# Patient Record
Sex: MALE | Race: BLACK OR AFRICAN AMERICAN | Employment: UNEMPLOYED | URBAN - METROPOLITAN AREA
[De-identification: names, ages, dates, MRNs, and addresses within clinical notes are randomized per-mention and may not be internally consistent; named-entity substitution may affect disease eponyms.]

---

## 2017-01-11 ENCOUNTER — APPOINTMENT (OUTPATIENT)
Dept: CT IMAGING | Age: 52
DRG: 388 | End: 2017-01-11
Attending: STUDENT IN AN ORGANIZED HEALTH CARE EDUCATION/TRAINING PROGRAM
Payer: SELF-PAY

## 2017-01-11 ENCOUNTER — HOSPITAL ENCOUNTER (INPATIENT)
Age: 52
LOS: 2 days | Discharge: HOME | DRG: 388 | End: 2017-01-13
Attending: EMERGENCY MEDICINE | Admitting: STUDENT IN AN ORGANIZED HEALTH CARE EDUCATION/TRAINING PROGRAM
Payer: SELF-PAY

## 2017-01-11 ENCOUNTER — APPOINTMENT (OUTPATIENT)
Dept: GENERAL RADIOLOGY | Age: 52
DRG: 388 | End: 2017-01-11
Attending: EMERGENCY MEDICINE
Payer: SELF-PAY

## 2017-01-11 DIAGNOSIS — N18.9 ANEMIA IN CHRONIC KIDNEY DISEASE: Primary | ICD-10-CM

## 2017-01-11 DIAGNOSIS — D63.1 ANEMIA IN CHRONIC KIDNEY DISEASE: Primary | ICD-10-CM

## 2017-01-11 DIAGNOSIS — K62.5 RECTAL BLEEDING: ICD-10-CM

## 2017-01-11 PROBLEM — K92.2 GIB (GASTROINTESTINAL BLEEDING): Status: ACTIVE | Noted: 2017-01-11

## 2017-01-11 LAB
ALBUMIN SERPL BCP-MCNC: 2.8 G/DL (ref 3.5–5)
ALBUMIN/GLOB SERPL: 0.7 {RATIO} (ref 1.1–2.2)
ALP SERPL-CCNC: 92 U/L (ref 45–117)
ALT SERPL-CCNC: 21 U/L (ref 12–78)
ANION GAP BLD CALC-SCNC: 13 MMOL/L (ref 5–15)
AST SERPL W P-5'-P-CCNC: 17 U/L (ref 15–37)
BASOPHILS # BLD AUTO: 0 K/UL (ref 0–0.1)
BASOPHILS # BLD: 0 % (ref 0–1)
BILIRUB SERPL-MCNC: 0.7 MG/DL (ref 0.2–1)
BUN SERPL-MCNC: 32 MG/DL (ref 6–20)
BUN/CREAT SERPL: 4 (ref 12–20)
CALCIUM SERPL-MCNC: 8.2 MG/DL (ref 8.5–10.1)
CHLORIDE SERPL-SCNC: 101 MMOL/L (ref 97–108)
CO2 SERPL-SCNC: 28 MMOL/L (ref 21–32)
CREAT SERPL-MCNC: 7.77 MG/DL (ref 0.7–1.3)
DIFFERENTIAL METHOD BLD: ABNORMAL
EOSINOPHIL # BLD: 0.3 K/UL (ref 0–0.4)
EOSINOPHIL NFR BLD: 2 % (ref 0–7)
ERYTHROCYTE [DISTWIDTH] IN BLOOD BY AUTOMATED COUNT: 14.5 % (ref 11.5–14.5)
FERRITIN SERPL-MCNC: 1620 NG/ML (ref 26–388)
GLOBULIN SER CALC-MCNC: 4.1 G/DL (ref 2–4)
GLUCOSE SERPL-MCNC: 100 MG/DL (ref 65–100)
HCT VFR BLD AUTO: 23.1 % (ref 36.6–50.3)
HEMOCCULT STL QL: POSITIVE
HGB BLD-MCNC: 7.3 G/DL (ref 12.1–17)
LIPASE SERPL-CCNC: 157 U/L (ref 73–393)
LYMPHOCYTES # BLD AUTO: 6 % (ref 12–49)
LYMPHOCYTES # BLD: 0.8 K/UL (ref 0.8–3.5)
MCH RBC QN AUTO: 28.5 PG (ref 26–34)
MCHC RBC AUTO-ENTMCNC: 31.6 G/DL (ref 30–36.5)
MCV RBC AUTO: 90.2 FL (ref 80–99)
MONOCYTES # BLD: 1.4 K/UL (ref 0–1)
MONOCYTES NFR BLD AUTO: 11 % (ref 5–13)
NEUTS SEG # BLD: 10.6 K/UL (ref 1.8–8)
NEUTS SEG NFR BLD AUTO: 81 % (ref 32–75)
PLATELET # BLD AUTO: 278 K/UL (ref 150–400)
POTASSIUM SERPL-SCNC: 3.6 MMOL/L (ref 3.5–5.1)
PROT SERPL-MCNC: 6.9 G/DL (ref 6.4–8.2)
RBC # BLD AUTO: 2.56 M/UL (ref 4.1–5.7)
RBC MORPH BLD: ABNORMAL
RBC MORPH BLD: ABNORMAL
SODIUM SERPL-SCNC: 142 MMOL/L (ref 136–145)
WBC # BLD AUTO: 13.1 K/UL (ref 4.1–11.1)

## 2017-01-11 PROCEDURE — 74011250637 HC RX REV CODE- 250/637: Performed by: STUDENT IN AN ORGANIZED HEALTH CARE EDUCATION/TRAINING PROGRAM

## 2017-01-11 PROCEDURE — 99285 EMERGENCY DEPT VISIT HI MDM: CPT

## 2017-01-11 PROCEDURE — 80053 COMPREHEN METABOLIC PANEL: CPT | Performed by: EMERGENCY MEDICINE

## 2017-01-11 PROCEDURE — 85025 COMPLETE CBC W/AUTO DIFF WBC: CPT | Performed by: EMERGENCY MEDICINE

## 2017-01-11 PROCEDURE — 86900 BLOOD TYPING SEROLOGIC ABO: CPT | Performed by: EMERGENCY MEDICINE

## 2017-01-11 PROCEDURE — 82728 ASSAY OF FERRITIN: CPT | Performed by: STUDENT IN AN ORGANIZED HEALTH CARE EDUCATION/TRAINING PROGRAM

## 2017-01-11 PROCEDURE — 74011250636 HC RX REV CODE- 250/636: Performed by: EMERGENCY MEDICINE

## 2017-01-11 PROCEDURE — 86920 COMPATIBILITY TEST SPIN: CPT | Performed by: EMERGENCY MEDICINE

## 2017-01-11 PROCEDURE — 65270000032 HC RM SEMIPRIVATE

## 2017-01-11 PROCEDURE — 36415 COLL VENOUS BLD VENIPUNCTURE: CPT | Performed by: EMERGENCY MEDICINE

## 2017-01-11 PROCEDURE — 96375 TX/PRO/DX INJ NEW DRUG ADDON: CPT

## 2017-01-11 PROCEDURE — 99218 HC RM OBSERVATION: CPT

## 2017-01-11 PROCEDURE — 96376 TX/PRO/DX INJ SAME DRUG ADON: CPT

## 2017-01-11 PROCEDURE — 96374 THER/PROPH/DIAG INJ IV PUSH: CPT

## 2017-01-11 PROCEDURE — 82272 OCCULT BLD FECES 1-3 TESTS: CPT | Performed by: STUDENT IN AN ORGANIZED HEALTH CARE EDUCATION/TRAINING PROGRAM

## 2017-01-11 PROCEDURE — 30233N1 TRANSFUSION OF NONAUTOLOGOUS RED BLOOD CELLS INTO PERIPHERAL VEIN, PERCUTANEOUS APPROACH: ICD-10-PCS | Performed by: STUDENT IN AN ORGANIZED HEALTH CARE EDUCATION/TRAINING PROGRAM

## 2017-01-11 PROCEDURE — 74011636320 HC RX REV CODE- 636/320: Performed by: STUDENT IN AN ORGANIZED HEALTH CARE EDUCATION/TRAINING PROGRAM

## 2017-01-11 PROCEDURE — 74011250636 HC RX REV CODE- 250/636: Performed by: STUDENT IN AN ORGANIZED HEALTH CARE EDUCATION/TRAINING PROGRAM

## 2017-01-11 PROCEDURE — 71010 XR CHEST PORT: CPT

## 2017-01-11 PROCEDURE — 83690 ASSAY OF LIPASE: CPT | Performed by: EMERGENCY MEDICINE

## 2017-01-11 PROCEDURE — 93005 ELECTROCARDIOGRAM TRACING: CPT

## 2017-01-11 PROCEDURE — 74177 CT ABD & PELVIS W/CONTRAST: CPT

## 2017-01-11 RX ORDER — SODIUM CHLORIDE 0.9 % (FLUSH) 0.9 %
5-10 SYRINGE (ML) INJECTION AS NEEDED
Status: DISCONTINUED | OUTPATIENT
Start: 2017-01-11 | End: 2017-01-13 | Stop reason: HOSPADM

## 2017-01-11 RX ORDER — DOCUSATE SODIUM 100 MG/1
100 CAPSULE, LIQUID FILLED ORAL 2 TIMES DAILY
Status: DISCONTINUED | OUTPATIENT
Start: 2017-01-11 | End: 2017-01-13 | Stop reason: HOSPADM

## 2017-01-11 RX ORDER — FAMOTIDINE 20 MG/1
20 TABLET, FILM COATED ORAL
Status: DISCONTINUED | OUTPATIENT
Start: 2017-01-11 | End: 2017-01-12

## 2017-01-11 RX ORDER — MORPHINE SULFATE 2 MG/ML
4 INJECTION, SOLUTION INTRAMUSCULAR; INTRAVENOUS
Status: COMPLETED | OUTPATIENT
Start: 2017-01-11 | End: 2017-01-11

## 2017-01-11 RX ORDER — HYDROMORPHONE HYDROCHLORIDE 1 MG/ML
1 INJECTION, SOLUTION INTRAMUSCULAR; INTRAVENOUS; SUBCUTANEOUS
Status: DISCONTINUED | OUTPATIENT
Start: 2017-01-11 | End: 2017-01-13 | Stop reason: HOSPADM

## 2017-01-11 RX ORDER — ACETAMINOPHEN 325 MG/1
650 TABLET ORAL
Status: DISCONTINUED | OUTPATIENT
Start: 2017-01-11 | End: 2017-01-13 | Stop reason: HOSPADM

## 2017-01-11 RX ORDER — MORPHINE SULFATE 4 MG/ML
4 INJECTION, SOLUTION INTRAMUSCULAR; INTRAVENOUS
Status: COMPLETED | OUTPATIENT
Start: 2017-01-11 | End: 2017-01-11

## 2017-01-11 RX ORDER — FUROSEMIDE 10 MG/ML
80 INJECTION INTRAMUSCULAR; INTRAVENOUS ONCE
Status: DISCONTINUED | OUTPATIENT
Start: 2017-01-11 | End: 2017-01-11

## 2017-01-11 RX ORDER — SODIUM CHLORIDE 0.9 % (FLUSH) 0.9 %
10 SYRINGE (ML) INJECTION
Status: COMPLETED | OUTPATIENT
Start: 2017-01-12 | End: 2017-01-12

## 2017-01-11 RX ORDER — ERGOCALCIFEROL 1.25 MG/1
50000 CAPSULE ORAL
Status: DISCONTINUED | OUTPATIENT
Start: 2017-01-11 | End: 2017-01-13 | Stop reason: HOSPADM

## 2017-01-11 RX ORDER — SODIUM CHLORIDE 0.9 % (FLUSH) 0.9 %
5-10 SYRINGE (ML) INJECTION EVERY 8 HOURS
Status: DISCONTINUED | OUTPATIENT
Start: 2017-01-11 | End: 2017-01-13 | Stop reason: HOSPADM

## 2017-01-11 RX ORDER — SODIUM CHLORIDE 9 MG/ML
100 INJECTION, SOLUTION INTRAVENOUS CONTINUOUS
Status: DISCONTINUED | OUTPATIENT
Start: 2017-01-11 | End: 2017-01-11

## 2017-01-11 RX ORDER — PROCHLORPERAZINE EDISYLATE 5 MG/ML
5 INJECTION INTRAMUSCULAR; INTRAVENOUS
Status: COMPLETED | OUTPATIENT
Start: 2017-01-11 | End: 2017-01-11

## 2017-01-11 RX ORDER — SODIUM CHLORIDE 9 MG/ML
250 INJECTION, SOLUTION INTRAVENOUS AS NEEDED
Status: DISCONTINUED | OUTPATIENT
Start: 2017-01-11 | End: 2017-01-13 | Stop reason: HOSPADM

## 2017-01-11 RX ORDER — ZOLPIDEM TARTRATE 5 MG/1
5 TABLET ORAL
Status: DISCONTINUED | OUTPATIENT
Start: 2017-01-11 | End: 2017-01-13 | Stop reason: HOSPADM

## 2017-01-11 RX ORDER — ONDANSETRON 2 MG/ML
4 INJECTION INTRAMUSCULAR; INTRAVENOUS
Status: DISCONTINUED | OUTPATIENT
Start: 2017-01-11 | End: 2017-01-13 | Stop reason: HOSPADM

## 2017-01-11 RX ORDER — HYDROMORPHONE HYDROCHLORIDE 2 MG/1
2 TABLET ORAL
Status: DISCONTINUED | OUTPATIENT
Start: 2017-01-11 | End: 2017-01-13 | Stop reason: HOSPADM

## 2017-01-11 RX ADMIN — SODIUM CHLORIDE 100 ML/HR: 900 INJECTION, SOLUTION INTRAVENOUS at 19:50

## 2017-01-11 RX ADMIN — ONDANSETRON 4 MG: 2 INJECTION INTRAMUSCULAR; INTRAVENOUS at 22:55

## 2017-01-11 RX ADMIN — Medication 10 ML: at 15:30

## 2017-01-11 RX ADMIN — FAMOTIDINE 20 MG: 20 TABLET ORAL at 21:44

## 2017-01-11 RX ADMIN — ERGOCALCIFEROL 50000 UNITS: 1.25 CAPSULE ORAL at 21:44

## 2017-01-11 RX ADMIN — DIATRIZOATE MEGLUMINE AND DIATRIZOATE SODIUM 30 ML: 600; 100 SOLUTION ORAL; RECTAL at 22:51

## 2017-01-11 RX ADMIN — Medication 10 ML: at 21:52

## 2017-01-11 RX ADMIN — PROCHLORPERAZINE EDISYLATE 5 MG: 5 INJECTION INTRAMUSCULAR; INTRAVENOUS at 19:48

## 2017-01-11 RX ADMIN — HYDROMORPHONE HYDROCHLORIDE 1 MG: 1 INJECTION, SOLUTION INTRAMUSCULAR; INTRAVENOUS; SUBCUTANEOUS at 22:55

## 2017-01-11 RX ADMIN — Medication 4 MG: at 19:48

## 2017-01-11 RX ADMIN — MORPHINE SULFATE 4 MG: 4 INJECTION, SOLUTION INTRAMUSCULAR; INTRAVENOUS at 15:30

## 2017-01-11 RX ADMIN — HYDROMORPHONE HYDROCHLORIDE 2 MG: 2 TABLET ORAL at 21:44

## 2017-01-11 NOTE — H&P
Pt Name  Martha Chavez   Date of Birth 1965   Medical Record Number  [id number]      Age  46 y.o. PCP Dayron Vallejo MD   Admit date:  1/11/2017    Room Number  207/01  @ [facility]   Date of Service  1/11/2017    Chart reviewed   Pt seen and examined       Admission Diagnoses:  Abdominal pain     We are admitting Jun Portillo 46 y.o. male with a principle diagnosis of Abdominal pain; this patient also suffers from other comorbidities listed below. I have a high level of concern for  leading to life threatening complications      Assessment and plan:    Rule out SBO  -getting CT abdo/pelvis with oral contrast    Anemia  -BTx 2  -ferritin  -FOBT  -SCDs    ESRD, uncontrolled HTN  -continue home meds, pain meds    Leucocytosis  -will observe    GI Bleed  -? Short gut syndrome  -FOBT positive  -GI consult  -PPI    Diarrhea  -workup    ED:   Principal Problem:    Abdominal pain (12/14/2015)    Active Problems:    ESRD (end stage renal disease) on dialysis ([address] M,W,F (11/16/2015)      Hx SBO (8/11/2016)      GIB (gastrointestinal bleeding) (1/11/2017)        Body mass index is 18.2 kg/(m^2). Functional Status  poor   CODE STATUS  Full   Surrogate decision maker: Pt is competent   Prophylaxis  SCD's   Discharge Plan: Home w/Family,    Contact Payor: [place] / Plan: [address] [place], [place] [address]y / Product Type: Medicare /    Social issues  Date Comment           Prognosis          PC:\"I was feeling horrible    History of Present Illness :  Mr. Akila West is a known ESRD patient on dialysis, Monday Wednesday Friday. He also short gut syndrome following surgery for small bowel obstruction and bowel resection in 2016 at [facility]. After dialysis today he was not feeling well, his abdominal pain, which is chronic, at a 6, was up to 9. He was also having headache weakness and fatigue on exertion. He has diarrhea but this is ongoing, brown in color, no blood. Mild nausea,no vomiting. In the ER he was noted to have a hemoglobin of 7 . Last month he was admitted and transfuse to hemoglobin of 11 . FOBT pos. Past Medical History   Diagnosis Date    Abscess of abdominal cavity ([address] 7/12/2016    Altered bowel elimination due to intestinal ostomy ([address] 7/14/2016    Altered bowel elimination due to intestinal ostomy ([address] 7/14/2016    Azotemia 7/12/2016    Chronic combined systolic and diastolic congestive heart failure (HCC) EF 45-50% 7/12/2016    Chronic pancreatitis ([address] 7/14/2016    Chronic pancreatitis ([address] 7/14/2016    ESRD (end stage renal disease) on dialysis ([address] M,W,F 11/16/2015    Essential hypertension with goal blood pressure less than 140/90 8/11/2016    History of DVT (deep vein thrombosis) 7/14/2016    History of DVT (deep vein thrombosis) 7/14/2016    Hx SBO 8/11/2016    Hypertension     Ileostomy in place ([address] 8/11/2016    Ill-defined condition      renal failure    LVH (left ventricular hypertrophy) 8/11/2016    On total parenteral nutrition (TPN) 7/14/2016    On total parenteral nutrition (TPN) 7/14/2016    Renal transplant failure and rejection 8/11/2016    S/P IVC filter 7/14/2016    S/P IVC filter 7/14/2016    S/P small bowel resection 8/11/2016    Severe protein-calorie malnutrition Durward Bitter: less than 60% of standard weight) ([address] 12/10/2015    Small bowel perforation ([address] 8/11/2016    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 8/11/2016       Past Surgical History   Procedure Laterality Date    Hx other surgical       fistula left arm    Hx transplant       kidney 1.5 years ago; removed after 1 month due to complications    Hx gi       peritoneal dialysis catheter placement    Hx gi       removal of PD catheter      No Known Allergies   Social History     Social History    Marital status:      Spouse name: N/A    Number of children: N/A    Years of education: N/A     Occupational History    Not on file. Social History Main Topics    Smoking status: Never Smoker    Smokeless tobacco: Not on file    Alcohol use No    Drug use: No    Sexual activity: No     Other Topics Concern    Not on file     Social History Narrative     Social History   Substance Use Topics    Smoking status: Never Smoker    Smokeless tobacco: Not on file    Alcohol use No       Family History   Problem Relation Age of Onset    No Known Problems Mother     Cancer Father     No Known Problems Sister        Review of Systems:  (negative unless bold)    Gen: Per HPI  Eyes:  Visual changes, pain, conjunctivitis  ENT:  Sore throat, rhinorrhea, decreased hearing  CVS:  Palpitations, chest pain, dizziness, syncope, edema, PND  Pulm:  Cough, dyspnea, sputum, hemoptysis, wheezing  GI: Per HPI  :  Hematuria, incontinence, nocturia, dysuria, discharge  MS:  Pain, weakness, swelling, arthritis  Skin:  Rash, erythema, abscess, wound, moles  Psych: Insomnia, depression, anxiety, crying, suicidal ideation  Endo:  Heat intolerance, cold intolerance, weight gain, polyuria  Hem:  Enlarged nodes, bruising, bleeding, night sweats  Renal:  Edema, change in urine, flank pain  Neuro:  Numbness, tingling, weakness, seizure, headache, tremors          Physical Exam:  Gen: pt hunched on his side, visibly in pain  HEENT:  Pink conjunctivae, PERRL, hearing intact to voice, moist mucous membranes  Neck:  Supple, without masses, thyroid non-tender  Resp:  No accessory muscle use, clear breath sounds without wheezes rales or rhonchi  Card:  No murmurs, normal S1, S2 without thrills, bruits or peripheral edema  Abd:  Soft, tender in the Right abdomen, mild peritonism, increased BS. Midline scar.   Lymph:  No cervical adenopathy  Musc:  No cyanosis or clubbing  Skin:  No rashes or ulcers, skin turgor is good  Neuro:  Cranial nerves 3-12 are grossly intact,  strength is 5/5 bilaterally, dorsi / plantarflexion strength is 5/5 bilaterally, follows commands appropriately  Psych:  Alert with good insight. Oriented to person, place, and time      Home Meds     Prior to Admission medications    Medication Sig Start Date End Date Taking? Authorizing Provider   ergocalciferol (ERGOCALCIFEROL) 50,000 unit capsule Take 1 Cap by mouth every seven (7) days for 7 doses. 12/5/16 1/17/17 Yes Jade Mack MD   famotidine (PEPCID) 20 mg tablet Take 1 Tab by mouth nightly. Can be obtained over-the-counter 11/29/16  Yes Lucía Jarrell MD   b complex-vitamin c-folic acid (NEPHROCAPS) 1 mg capsule Take 1 Cap by mouth daily. 11/30/16  Yes Lucía Jarrell MD   psyllium husk-aspartame (METAMUCIL FIBER) 3.4 gram pwpk packet Take 1 Packet by mouth two (2) times a day. Can be obtained over-the-counter 11/29/16  Yes Lucía Jarrell MD   HYDROmorphone (DILAUDID) 2 mg tablet Take 2 mg by mouth every four (4) hours as needed for Pain. Yes Historical Provider       Relevant other informations: Other medical conditions listed in this following active hospital problem list section; all of these and other pertinent data were taken into consideration when treatment plan is developed and customized to this patient's unique overall circumstances and needs.    Patient Active Problem List    Diagnosis Date Noted    GIB (gastrointestinal bleeding) 01/11/2017    Pancreatitis 12/01/2016    Hx SBO 08/11/2016    Small bowel perforation (HCC) 08/11/2016    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 08/11/2016    Renal transplant failure and rejection 08/11/2016    Essential hypertension 08/11/2016    S/P small bowel resection 08/11/2016    Ileostomy in place [facility]) 08/11/2016    LVH (left ventricular hypertrophy) 08/11/2016    On total parenteral nutrition (TPN) 07/14/2016    Chronic pancreatitis ([facility] 07/14/2016    Altered bowel elimination due to intestinal ostomy ([facility] 07/14/2016    History of DVT (deep vein thrombosis) 07/14/2016    S/P IVC filter 07/14/2016    Anemia in chronic kidney disease 07/12/2016    Status post laparotomy 07/12/2016    Abscess of abdominal cavity ([facility] 07/12/2016    Vitamin D deficiency 07/12/2016    History of acute pancreatitis 07/12/2016    Steal syndrome as complication of dialysis access ([facility] 07/12/2016    Azotemia 07/12/2016    Chronic combined systolic and diastolic congestive heart failure (HCC) EF 45-50% 07/12/2016    Abdominal pain 12/14/2015    Severe protein-calorie malnutrition Reyna Kindler: less than 60% of standard weight) ([facility] 12/10/2015    HTN (hypertension) 11/16/2015    ESRD (end stage renal disease) on dialysis ([facility] M,W,F 11/16/2015    Small bowel obstruction ([facility] 11/16/2015        Data Review:   Recent Days:  Recent Labs      01/11/17   1519   WBC  13.1*   HGB  7.3*   HCT  23.1*   PLT  278     Recent Labs      01/11/17   1519   NA  142   K  3.6   CL  101   CO2  28   GLU  100   BUN  32*   CREA  7.77*   CA  8.2*   ALB  2.8*   TBILI  0.7   SGOT  17   ALT  21     No results found for: TSH, TSHEXT, TSHEXT  ______________________________________________________________________________________________________    Medications reviewed     Current Facility-Administered Medications   Medication Dose Route Frequency    sodium chloride (NS) flush 5-10 mL  5-10 mL IntraVENous Q8H    sodium chloride (NS) flush 5-10 mL  5-10 mL IntraVENous PRN    [START ON 1/12/2017] B complex-vitaminC-folic acid (NEPHROCAP) cap  1 Cap Oral DAILY    ergocalciferol (ERGOCALCIFEROL) capsule 50,000 Units  50,000 Units Oral Q7D    famotidine (PEPCID) tablet 20 mg  20 mg Oral QHS    HYDROmorphone (DILAUDID) tablet 2 mg  2 mg Oral Q4H PRN    psyllium husk-aspartame (METAMUCIL FIBER) packet 1 Packet  1 Packet Oral BID    0.9% sodium chloride infusion 250 mL  250 mL IntraVENous PRN    sodium chloride (NS) flush 5-10 mL  5-10 mL IntraVENous Q8H    sodium chloride (NS) flush 5-10 mL  5-10 mL IntraVENous PRN    acetaminophen (TYLENOL) tablet 650 mg  650 mg Oral Q4H PRN    ondansetron (ZOFRAN) injection 4 mg  4 mg IntraVENous Q4H PRN    docusate sodium (COLACE) capsule 100 mg  100 mg Oral BID    zolpidem (AMBIEN) tablet 5 mg  5 mg Oral QHS PRN    pantoprazole (PROTONIX) 40 mg in sodium chloride 0.9 % 10 mL injection  40 mg IntraVENous Q12H    HYDROmorphone (PF) (DILAUDID) injection 1 mg  1 mg IntraVENous Q4H PRN       _________________________________________________________________________________  Care Plan discussed with: Patient/Family  ______________________________________________________________________________________________________    High complexity decision making was performed for this patient who is at high risk for decompensation with multiple organ involvement. Today total floor/unit time was 60 minutes while caring for this patient and greater than 50% of that time was spent with patient (and/or family) discussing patients clinical issues.     ________________________________________________________________________  Caitlyn Simpson MD                            1/11/2017 3:57 PM   ________________________________________________________________________

## 2017-01-11 NOTE — ED NOTES
Per pt reports rectal pain that started today after dialysis, \"it's apart of my short gut syndrome, I have diarrhea all the time. \" Right lower abdominal pain, \"use to have an ileostomy\" x 3-4 months ago, it's sutured now. Denies CP and SOB.

## 2017-01-11 NOTE — ED TRIAGE NOTES
Hx of HTN and dialysis, LBM 10 min pta, no active bleeding, denies hx of hemorrhoids. Shunt left upper arm, M/W/F(Dialysis).

## 2017-01-11 NOTE — ED PROVIDER NOTES
Patient is a 46 y.o. male presenting with pain and abdominal pain. The history is provided by the patient and medical records. No  was used. Rectal Pain    This is a chronic problem. Associated symptoms include abdominal pain and diarrhea. Pertinent negatives include no fever and no constipation. Abdominal Pain    Associated symptoms include diarrhea. Pertinent negatives include no fever and no constipation. Pt with chronic abdominal pain/diarrhea. He states that he is here today because dialysis told him that he needed a blood transfusion. Denies CP, SOP.      Past Medical History:   Diagnosis Date    Abscess of abdominal cavity ([address] 7/12/2016    Altered bowel elimination due to intestinal ostomy ([address] 7/14/2016    Altered bowel elimination due to intestinal ostomy ([address] 7/14/2016    Azotemia 7/12/2016    Chronic combined systolic and diastolic congestive heart failure (HCC) EF 45-50% 7/12/2016    Chronic pancreatitis ([address] 7/14/2016    Chronic pancreatitis ([address] 7/14/2016    ESRD (end stage renal disease) on dialysis ([address] M,W,F 11/16/2015    Essential hypertension with goal blood pressure less than 140/90 8/11/2016    History of DVT (deep vein thrombosis) 7/14/2016    History of DVT (deep vein thrombosis) 7/14/2016    Hx SBO 8/11/2016    Hypertension     Ileostomy in place ([address] 8/11/2016    Ill-defined condition      renal failure    LVH (left ventricular hypertrophy) 8/11/2016    On total parenteral nutrition (TPN) 7/14/2016    On total parenteral nutrition (TPN) 7/14/2016    Renal transplant failure and rejection 8/11/2016    S/P IVC filter 7/14/2016    S/P IVC filter 7/14/2016    S/P small bowel resection 8/11/2016    Severe protein-calorie malnutrition Chelle Ligas: less than 60% of standard weight) ([address] 12/10/2015    Small bowel perforation ([address] 8/11/2016    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 8/11/2016       Past Surgical History:   Procedure Laterality Date    Hx other surgical       fistula left arm    Hx transplant       kidney 1.5 years ago; removed after 1 month due to complications    Hx gi       peritoneal dialysis catheter placement    Hx gi       removal of PD catheter         Family History:   Problem Relation Age of Onset    No Known Problems Mother     Cancer Father     No Known Problems Sister        Social History     Social History    Marital status:      Spouse name: N/A    Number of children: N/A    Years of education: N/A     Occupational History    Not on file. Social History Main Topics    Smoking status: Never Smoker    Smokeless tobacco: Not on file    Alcohol use No    Drug use: No    Sexual activity: No     Other Topics Concern    Not on file     Social History Narrative         ALLERGIES: Review of patient's allergies indicates no known allergies. Review of Systems   Constitutional: Negative for fever. Gastrointestinal: Positive for abdominal pain and diarrhea. Negative for constipation. All other systems reviewed and are negative. Vitals:    01/11/17 1426   BP: (!) 175/103   Pulse: (!) 105   Resp: 19   Temp: 98.7 °F (37.1 °C)   SpO2: 99%   Weight: 56.7 kg (125 lb)   Height: 5' 8\" (1.727 m)            Physical Exam   Constitutional: He is oriented to person, place, and time. He appears well-developed and well-nourished. Small framed Black male uncomfortable appearing   HENT:   Head: Normocephalic and atraumatic. Mouth/Throat: Oropharynx is clear and moist. No oropharyngeal exudate. Eyes: Conjunctivae and EOM are normal. Pupils are equal, round, and reactive to light. Right eye exhibits no discharge. Left eye exhibits no discharge. No scleral icterus. Neck: Normal range of motion. Neck supple. Cardiovascular: Normal rate, regular rhythm and normal heart sounds. Pulmonary/Chest: Effort normal and breath sounds normal. No respiratory distress.  He has no wheezes. He has no rales. Abdominal: Soft. He exhibits no distension. There is no tenderness. There is no rebound and no guarding. Increased bs throughout, diffusely tender   Musculoskeletal: Normal range of motion. He exhibits no edema or tenderness. Neurological: He is alert and oriented to person, place, and time. Skin: Skin is warm and dry. No rash noted. No erythema. No pallor. Psychiatric: He has a normal mood and affect. His behavior is normal.   Nursing note and vitals reviewed. [facility]  ED Course       Procedures        3:57 PM    I spoke with Dr. Kaylen Ferguson for Hospitalist. Discussed available diagnostic tests and clinical findings. He is in agreement with care plans as outlined. He will admit pt.   Dot Carter MD

## 2017-01-11 NOTE — IP AVS SNAPSHOT
[facility] 
 
 
 [address] Patient: Carolina Pacheco MRN: [id number] :1965 You are allergic to the following No active allergies Recent Documentation Height Weight BMI Smoking Status 1.727 m 55.4 kg 18.57 kg/m2 Never Smoker Unresulted Labs Order Current Status AMINO ACID PROFILE, QUANTITATIVE, PLASMA In process C REACTIVE PROTEIN, QT In process LACTOFERRIN, FECAL In process Emergency Contacts Name Discharge Info Relation Home Work Mobile Anne Marie Gonzalez 89 CAREGIVER [3] Mother [14] [phone] Areta Lat  Child [2] [phone] Areta Lat  Child [2] [phone] About your hospitalization You were admitted on:  2017 You last received care in the:  [address] You were discharged on:  2017 Unit phone number:  [phone] Why you were hospitalized Your primary diagnosis was:  Abdominal Pain Your diagnoses also included:  Gib (Gastrointestinal Bleeding), Hx Sbo, Esrd (End Stage Renal Disease) On Dialysis (Hcc), Partial Small Bowel Obstruction (Hcc) Providers Seen During Your Hospitalizations Provider Role Specialty Primary office phone Clau Chan MD Attending Provider Emergency Medicine [phone] Shayan Day MD Attending Provider Emergency Medicine [phone] James Gary MD Attending Provider Internal Medicine [phone] Your Primary Care Physician (PCP) Primary Care Physician Office Phone Office Fax Pima Sam [phone] Follow-up Information Follow up With Details Comments Contact Info Radha Branch MD On 2017 PATSY Pina  at 9:30 am. please bring ID & insurance card. list of medications. - [facility] 
[phone] Roberto Alas MD   [address] [place] [id number] Current Discharge Medication List  
  
CONTINUE these medications which have NOT CHANGED Dose & Instructions Dispensing Information Comments Morning Noon Evening Bedtime  
 amLODIPine 5 mg tablet Commonly known as:  [place] Your next dose is: Today, Tomorrow Other:  _________ Dose:  5 mg Take 5 mg by mouth daily. Refills:  0  
     
   
   
   
  
 b complex-vitamin c-folic acid 1 mg capsule Commonly known as:  Sujey Edgar Your next dose is: Today, Tomorrow Other:  _________ Dose:  1 Cap Take 1 Cap by mouth daily. Quantity:  30 Cap Refills:  2 cholecalciferol (VITAMIN D3) 5,000 unit Tab tablet Commonly known as:  VITAMIN D3 Your next dose is: Today, Tomorrow Other:  _________ Dose:  5000 Units Take 5,000 Units by mouth daily. Refills:  0  
     
   
   
   
  
 CREON 36,000-114,000- 180,000 unit Cpdr  
Generic drug:  lipase-protease-amylase Your next dose is: Today, Tomorrow Other:  _________ Dose:  2 Cap Take 2 Caps by mouth three (3) times daily (with meals). and may also take one capsule with three snacks per day. Do not take more than 15 capsules per day. Refills:  0  
     
   
   
   
  
 famotidine 20 mg tablet Commonly known as:  PEPCID Your next dose is: Today, Tomorrow Other:  _________ Dose:  20 mg Take 1 Tab by mouth nightly. Can be obtained over-the-counter Quantity:  30 Tab Refills:  2  
     
   
   
   
  
 gabapentin 100 mg capsule Commonly known as:  NEURONTIN Your next dose is: Today, Tomorrow Other:  _________ Dose:  200 mg Take 200 mg by mouth three (3) times daily. Refills:  0  
     
   
   
   
  
 loperamide 2 mg capsule Commonly known as:  IMODIUM Your next dose is: Today, Tomorrow Other:  _________ Dose:  4 mg Take 4 mg by mouth four (4) times daily. ==HOLD FOR CONSTIPATION OR IF NO BM FOR 2 DAYS== Refills:  0  
     
   
   
   
  
 metoprolol succinate 25 mg XL tablet Commonly known as:  TOPROL-XL Your next dose is: Today, Tomorrow Other:  _________ Dose:  12.5 mg Take 12.5 mg by mouth daily. Refills:  0 Discharge Instructions Bowel Blockage (Intestinal Obstruction): Care Instructions Your Care Instructions A bowel blockage, also called an intestinal obstruction, can prevent gas, fluids, or solids from moving through the intestines normally. It can cause constipation and, rarely, diarrhea. You may have pain, nausea, vomiting, and cramping. Most of the time, complete blockages require a stay in the hospital and possibly surgery. But if your bowel is only partly blocked, your doctor may tell you to wait until it clears on its own and you are able to pass gas and stool. If so, there are things you can do at home to help make you feel better. If you have had surgery for a bowel blockage, there are things you can do at home to make sure you heal well. You can also make some changes to keep your bowel from becoming blocked again. Follow-up care is a key part of your treatment and safety. Be sure to make and go to all appointments, and call your doctor if you are having problems. It's also a good idea to know your test results and keep a list of the medicines you take. How can you care for yourself at home? If your doctor has told you to wait at home for a blockage to clear on its own: · Follow your doctor's instructions. These may include eating a liquid diet to avoid complete blockage. · Take your medicines exactly as prescribed. Call your doctor if you think you are having a problem with your medicine. · Put a heating pad set on low on your belly to relieve mild cramps and pain. To prevent another blockage · Try to eat smaller amounts of food more often. For example, have 5 or 6 small meals throughout the day instead of 2 or 3 large meals. · Chew your food very well. Try to chew each bite about 20 times or until it is liquid. · Avoid high-fiber foods and raw fruits and vegetables with skins, husks, strings, or seeds. These can form a ball of undigested material that can cause a blockage if a part of your bowel is scarred or narrowed. · Check with your doctor before you eat whole-grain products or use a fiber supplement such as Citrucel or Metamucil. · To help you have regular bowel movements, eat at regular times, do not strain during a bowel movement, and drink at least 8 to 10 glasses of water each day. If you have kidney, heart, or liver disease and have to limit fluids, talk with your doctor or before you increase the amount of fluids you drink. · Drink high-calorie liquid formulas if your doctor says to. Severe symptoms may make it hard for your body to take in vitamins and minerals. · Get regular exercise. It helps you digest your food better. Get at least 30 minutes of physical activity on most days of the week. Walking is a good choice. When should you call for help? Call 911 anytime you think you may need emergency care. For example, call if: 
· You passed out (lost consciousness). · You have severe pain or swelling in your belly. · You vomit blood or what looks like coffee grounds. · You pass maroon or very bloody stools. · You cannot pass any stools or gas. Call your doctor now or seek immediate medical care if: 
· You have a new or higher fever. · You cannot keep fluids or medicines down. · You have new pain that gets worse when you move or cough. · Your symptoms become much worse than usual. 
Watch closely for changes in your health, and be sure to contact your doctor if: 
· You do not get better as expected. · You have steady diarrhea for more than 2 weeks. · You've been losing weight. Where can you learn more? Go to http://jenn-ellen.info/. Enter P454 in the search box to learn more about \"Bowel Blockage (Intestinal Obstruction): Care Instructions. \" Current as of: August 9, 2016 Content Version: 11.1 © 5464-2586 [employer]. Care instructions adapted under license by [employer] (which disclaims liability or warranty for this information). If you have questions about a medical condition or this instruction, always ask your healthcare professional. [address] any warranty or liability for your use of this information. Abdominal Pain: Care Instructions Your Care Instructions Abdominal pain has many possible causes. Some aren't serious and get better on their own in a few days. Others need more testing and treatment. If your pain continues or gets worse, you need to be rechecked and may need more tests to find out what is wrong. You may need surgery to correct the problem. Don't ignore new symptoms, such as fever, nausea and vomiting, urination problems, pain that gets worse, and dizziness. These may be signs of a more serious problem. Your doctor may have recommended a follow-up visit in the next 8 to 12 hours. If you are not getting better, you may need more tests or treatment. The doctor has checked you carefully, but problems can develop later. If you notice any problems or new symptoms, get medical treatment right away. Follow-up care is a key part of your treatment and safety. Be sure to make and go to all appointments, and call your doctor if you are having problems. It's also a good idea to know your test results and keep a list of the medicines you take. How can you care for yourself at home? · Rest until you feel better. · To prevent dehydration, drink plenty of fluids, enough so that your urine is light yellow or clear like water.  Choose water and other caffeine-free clear liquids until you feel better. If you have kidney, heart, or liver disease and have to limit fluids, talk with your doctor before you increase the amount of fluids you drink. · If your stomach is upset, eat mild foods, such as rice, dry toast or crackers, bananas, and applesauce. Try eating several small meals instead of two or three large ones. · Wait until 48 hours after all symptoms have gone away before you have spicy foods, alcohol, and drinks that contain caffeine. · Do not eat foods that are high in fat. · Avoid anti-inflammatory medicines such as aspirin, ibuprofen (Advil, Motrin), and naproxen (Aleve). These can cause stomach upset. Talk to your doctor if you take daily aspirin for another health problem. When should you call for help? Call 911 anytime you think you may need emergency care. For example, call if: 
· You passed out (lost consciousness). · You pass maroon or very bloody stools. · You vomit blood or what looks like coffee grounds. · You have new, severe belly pain. Call your doctor now or seek immediate medical care if: 
· Your pain gets worse, especially if it becomes focused in one area of your belly. · You have a new or higher fever. · Your stools are black and look like tar, or they have streaks of blood. · You have unexpected vaginal bleeding. · You have symptoms of a urinary tract infection. These may include: 
¨ Pain when you urinate. ¨ Urinating more often than usual. 
¨ Blood in your urine. · You are dizzy or lightheaded, or you feel like you may faint. Watch closely for changes in your health, and be sure to contact your doctor if: 
· You are not getting better after 1 day (24 hours). Where can you learn more? Go to http://jenn-ellen.info/. Enter F173 in the search box to learn more about \"Abdominal Pain: Care Instructions. \" Current as of: May 27, 2016 Content Version: 11.1 © 4010-6506 [employer]. Care instructions adapted under license by [employer] (which disclaims liability or warranty for this information). If you have questions about a medical condition or this instruction, always ask your healthcare professional. [address] any warranty or liability for your use of this information. Discharge Orders None [employer] Announcement We are excited to announce that we are making your provider's discharge notes available to you in [employer]. You will see these notes when they are completed and signed by the physician that discharged you from your recent hospital stay. If you have any questions or concerns about any information you see in [employer], please call the Health Information Department where you were seen or reach out to your Primary Care Provider for more information about your plan of care. Introducing [facility]! Desi Dorsey introduces [employer] patient portal. Now you can access parts of your medical record, email your doctor's office, and request medication refills online. 1. In your internet browser, go to https://"[employer]. [employer]ie/"[employer] 2. Click on the First Time User? Click Here link in the Sign In box. You will see the New Member Sign Up page. 3. Enter your [employer] Access Code exactly as it appears below. You will not need to use this code after youve completed the sign-up process. If you do not sign up before the expiration date, you must request a new code. · [employer] Access Code: [email or web address] Expires: 2/26/2017 12:26 PM 
 
4. Enter the last four digits of your Social Security Number (xxxx) and Date of Birth (mm/dd/yyyy) as indicated and click Submit. You will be taken to the next sign-up page. 5. Create a [employer] ID. This will be your [employer] login ID and cannot be changed, so think of one that is secure and easy to remember. 6. Create a [employer] password. You can change your password at any time. 7. Enter your Password Reset Question and Answer. This can be used at a later time if you forget your password. 8. Enter your e-mail address. You will receive e-mail notification when new information is available in [address] 9. Click Sign Up. You can now view and download portions of your medical record. 10. Click the Download Summary menu link to download a portable copy of your medical information. If you have questions, please visit the Frequently Asked Questions section of the [employer] website. Remember, [employer] is NOT to be used for urgent needs. For medical emergencies, dial 911. Now available from your iPhone and Android! General Information Please provide this summary of care documentation to your next provider. Patient Signature:  ____________________________________________________________ Date:  ____________________________________________________________  
  
[facility] Provider Signature:  ____________________________________________________________ Date:  ____________________________________________________________

## 2017-01-12 PROBLEM — K56.600 PARTIAL SMALL BOWEL OBSTRUCTION (HCC): Status: ACTIVE | Noted: 2017-01-12

## 2017-01-12 LAB
ALBUMIN SERPL BCP-MCNC: 2.9 G/DL (ref 3.5–5)
ALBUMIN/GLOB SERPL: 0.7 {RATIO} (ref 1.1–2.2)
ALP SERPL-CCNC: 95 U/L (ref 45–117)
ALT SERPL-CCNC: 21 U/L (ref 12–78)
ANION GAP BLD CALC-SCNC: 16 MMOL/L (ref 5–15)
AST SERPL W P-5'-P-CCNC: 18 U/L (ref 15–37)
ATRIAL RATE: 107 BPM
BASOPHILS # BLD AUTO: 0.1 K/UL (ref 0–0.1)
BASOPHILS # BLD: 1 % (ref 0–1)
BILIRUB SERPL-MCNC: 1.3 MG/DL (ref 0.2–1)
BUN SERPL-MCNC: 40 MG/DL (ref 6–20)
BUN/CREAT SERPL: 4 (ref 12–20)
C DIFF TOX GENS STL QL NAA+PROBE: NEGATIVE
CALCIUM SERPL-MCNC: 8.4 MG/DL (ref 8.5–10.1)
CALCULATED P AXIS, ECG09: 58 DEGREES
CALCULATED R AXIS, ECG10: -15 DEGREES
CALCULATED T AXIS, ECG11: 121 DEGREES
CHLORIDE SERPL-SCNC: 100 MMOL/L (ref 97–108)
CO2 SERPL-SCNC: 21 MMOL/L (ref 21–32)
CREAT SERPL-MCNC: 9.26 MG/DL (ref 0.7–1.3)
CRP SERPL-MCNC: 4.04 MG/DL (ref 0–0.6)
DIAGNOSIS, 93000: NORMAL
EOSINOPHIL # BLD: 0.2 K/UL (ref 0–0.4)
EOSINOPHIL NFR BLD: 2 % (ref 0–7)
ERYTHROCYTE [DISTWIDTH] IN BLOOD BY AUTOMATED COUNT: 14.7 % (ref 11.5–14.5)
GLOBULIN SER CALC-MCNC: 3.9 G/DL (ref 2–4)
GLUCOSE SERPL-MCNC: 78 MG/DL (ref 65–100)
HCT VFR BLD AUTO: 28.9 % (ref 36.6–50.3)
HGB BLD-MCNC: 9.3 G/DL (ref 12.1–17)
LYMPHOCYTES # BLD AUTO: 8 % (ref 12–49)
LYMPHOCYTES # BLD: 1 K/UL (ref 0.8–3.5)
MAGNESIUM SERPL-MCNC: 1.4 MG/DL (ref 1.6–2.4)
MCH RBC QN AUTO: 28.6 PG (ref 26–34)
MCHC RBC AUTO-ENTMCNC: 32.2 G/DL (ref 30–36.5)
MCV RBC AUTO: 88.9 FL (ref 80–99)
MONOCYTES # BLD: 1.4 K/UL (ref 0–1)
MONOCYTES NFR BLD AUTO: 11 % (ref 5–13)
NEUTS SEG # BLD: 9.7 K/UL (ref 1.8–8)
NEUTS SEG NFR BLD AUTO: 78 % (ref 32–75)
P-R INTERVAL, ECG05: 142 MS
PHOSPHATE SERPL-MCNC: 8.5 MG/DL (ref 2.6–4.7)
PLATELET # BLD AUTO: 269 K/UL (ref 150–400)
POTASSIUM SERPL-SCNC: 4.7 MMOL/L (ref 3.5–5.1)
PROT SERPL-MCNC: 6.8 G/DL (ref 6.4–8.2)
Q-T INTERVAL, ECG07: 396 MS
QRS DURATION, ECG06: 84 MS
QTC CALCULATION (BEZET), ECG08: 528 MS
RBC # BLD AUTO: 3.25 M/UL (ref 4.1–5.7)
SODIUM SERPL-SCNC: 137 MMOL/L (ref 136–145)
VENTRICULAR RATE, ECG03: 107 BPM
WBC # BLD AUTO: 12.3 K/UL (ref 4.1–11.1)
WBC #/AREA STL HPF: NORMAL /HPF (ref 0–4)

## 2017-01-12 PROCEDURE — C9113 INJ PANTOPRAZOLE SODIUM, VIA: HCPCS | Performed by: STUDENT IN AN ORGANIZED HEALTH CARE EDUCATION/TRAINING PROGRAM

## 2017-01-12 PROCEDURE — 74011636320 HC RX REV CODE- 636/320: Performed by: STUDENT IN AN ORGANIZED HEALTH CARE EDUCATION/TRAINING PROGRAM

## 2017-01-12 PROCEDURE — 83631 LACTOFERRIN FECAL (QUANT): CPT | Performed by: STUDENT IN AN ORGANIZED HEALTH CARE EDUCATION/TRAINING PROGRAM

## 2017-01-12 PROCEDURE — 36430 TRANSFUSION BLD/BLD COMPNT: CPT

## 2017-01-12 PROCEDURE — 85025 COMPLETE CBC W/AUTO DIFF WBC: CPT | Performed by: STUDENT IN AN ORGANIZED HEALTH CARE EDUCATION/TRAINING PROGRAM

## 2017-01-12 PROCEDURE — 84100 ASSAY OF PHOSPHORUS: CPT | Performed by: STUDENT IN AN ORGANIZED HEALTH CARE EDUCATION/TRAINING PROGRAM

## 2017-01-12 PROCEDURE — 74011000250 HC RX REV CODE- 250: Performed by: STUDENT IN AN ORGANIZED HEALTH CARE EDUCATION/TRAINING PROGRAM

## 2017-01-12 PROCEDURE — 87493 C DIFF AMPLIFIED PROBE: CPT | Performed by: STUDENT IN AN ORGANIZED HEALTH CARE EDUCATION/TRAINING PROGRAM

## 2017-01-12 PROCEDURE — P9016 RBC LEUKOCYTES REDUCED: HCPCS | Performed by: EMERGENCY MEDICINE

## 2017-01-12 PROCEDURE — 36415 COLL VENOUS BLD VENIPUNCTURE: CPT | Performed by: STUDENT IN AN ORGANIZED HEALTH CARE EDUCATION/TRAINING PROGRAM

## 2017-01-12 PROCEDURE — 74011250637 HC RX REV CODE- 250/637: Performed by: STUDENT IN AN ORGANIZED HEALTH CARE EDUCATION/TRAINING PROGRAM

## 2017-01-12 PROCEDURE — 83735 ASSAY OF MAGNESIUM: CPT | Performed by: STUDENT IN AN ORGANIZED HEALTH CARE EDUCATION/TRAINING PROGRAM

## 2017-01-12 PROCEDURE — 80053 COMPREHEN METABOLIC PANEL: CPT | Performed by: STUDENT IN AN ORGANIZED HEALTH CARE EDUCATION/TRAINING PROGRAM

## 2017-01-12 PROCEDURE — 65270000032 HC RM SEMIPRIVATE

## 2017-01-12 PROCEDURE — 74011250636 HC RX REV CODE- 250/636: Performed by: STUDENT IN AN ORGANIZED HEALTH CARE EDUCATION/TRAINING PROGRAM

## 2017-01-12 PROCEDURE — 89055 LEUKOCYTE ASSESSMENT FECAL: CPT | Performed by: STUDENT IN AN ORGANIZED HEALTH CARE EDUCATION/TRAINING PROGRAM

## 2017-01-12 PROCEDURE — 86140 C-REACTIVE PROTEIN: CPT | Performed by: STUDENT IN AN ORGANIZED HEALTH CARE EDUCATION/TRAINING PROGRAM

## 2017-01-12 RX ORDER — METOPROLOL SUCCINATE 25 MG/1
12.5 TABLET, EXTENDED RELEASE ORAL DAILY
COMMUNITY

## 2017-01-12 RX ORDER — POTASSIUM CHLORIDE 7.45 MG/ML
10 INJECTION INTRAVENOUS
Status: DISCONTINUED | OUTPATIENT
Start: 2017-01-12 | End: 2017-01-12

## 2017-01-12 RX ORDER — MAGNESIUM SULFATE HEPTAHYDRATE 40 MG/ML
2 INJECTION, SOLUTION INTRAVENOUS ONCE
Status: COMPLETED | OUTPATIENT
Start: 2017-01-12 | End: 2017-01-12

## 2017-01-12 RX ORDER — LOPERAMIDE HYDROCHLORIDE 2 MG/1
4 CAPSULE ORAL 4 TIMES DAILY
COMMUNITY

## 2017-01-12 RX ORDER — CHOLECALCIFEROL TAB 125 MCG (5000 UNIT) 125 MCG
5000 TAB ORAL DAILY
COMMUNITY

## 2017-01-12 RX ORDER — GABAPENTIN 100 MG/1
200 CAPSULE ORAL 3 TIMES DAILY
COMMUNITY

## 2017-01-12 RX ORDER — MAGNESIUM SULFATE 1 G/100ML
1 INJECTION INTRAVENOUS ONCE
Status: ACTIVE | OUTPATIENT
Start: 2017-01-12 | End: 2017-01-13

## 2017-01-12 RX ORDER — AMLODIPINE BESYLATE 5 MG/1
10 TABLET ORAL DAILY
Status: DISCONTINUED | OUTPATIENT
Start: 2017-01-12 | End: 2017-01-13 | Stop reason: HOSPADM

## 2017-01-12 RX ORDER — AMLODIPINE BESYLATE 5 MG/1
5 TABLET ORAL DAILY
COMMUNITY

## 2017-01-12 RX ORDER — LABETALOL 200 MG/1
200 TABLET, FILM COATED ORAL EVERY 12 HOURS
Status: DISCONTINUED | OUTPATIENT
Start: 2017-01-12 | End: 2017-01-13 | Stop reason: HOSPADM

## 2017-01-12 RX ADMIN — HYDROMORPHONE HYDROCHLORIDE 1 MG: 1 INJECTION, SOLUTION INTRAMUSCULAR; INTRAVENOUS; SUBCUTANEOUS at 16:17

## 2017-01-12 RX ADMIN — Medication 10 ML: at 01:28

## 2017-01-12 RX ADMIN — LABETALOL HYDROCHLORIDE 200 MG: 200 TABLET, FILM COATED ORAL at 10:10

## 2017-01-12 RX ADMIN — MAGNESIUM SULFATE HEPTAHYDRATE 2 G: 40 INJECTION, SOLUTION INTRAVENOUS at 17:36

## 2017-01-12 RX ADMIN — Medication 10 ML: at 15:50

## 2017-01-12 RX ADMIN — HYDROMORPHONE HYDROCHLORIDE 1 MG: 1 INJECTION, SOLUTION INTRAMUSCULAR; INTRAVENOUS; SUBCUTANEOUS at 10:54

## 2017-01-12 RX ADMIN — Medication 10 ML: at 15:51

## 2017-01-12 RX ADMIN — SODIUM CHLORIDE 40 MG: 9 INJECTION, SOLUTION INTRAMUSCULAR; INTRAVENOUS; SUBCUTANEOUS at 01:28

## 2017-01-12 RX ADMIN — Medication 10 ML: at 05:02

## 2017-01-12 RX ADMIN — RENO CAPS 1 CAPSULE: 100; 1.5; 1.7; 20; 10; 1; 150; 5; 6 CAPSULE ORAL at 08:40

## 2017-01-12 RX ADMIN — IOPAMIDOL 100 ML: 755 INJECTION, SOLUTION INTRAVENOUS at 00:00

## 2017-01-12 RX ADMIN — ACETAMINOPHEN 650 MG: 325 TABLET ORAL at 10:23

## 2017-01-12 RX ADMIN — ACETAMINOPHEN 650 MG: 325 TABLET ORAL at 21:58

## 2017-01-12 RX ADMIN — Medication 10 ML: at 00:00

## 2017-01-12 RX ADMIN — HYDROMORPHONE HYDROCHLORIDE 1 MG: 1 INJECTION, SOLUTION INTRAMUSCULAR; INTRAVENOUS; SUBCUTANEOUS at 06:44

## 2017-01-12 RX ADMIN — LABETALOL HYDROCHLORIDE 200 MG: 200 TABLET, FILM COATED ORAL at 20:26

## 2017-01-12 RX ADMIN — AMLODIPINE BESYLATE 10 MG: 5 TABLET ORAL at 10:10

## 2017-01-12 RX ADMIN — PSYLLIUM HUSK 1 PACKET: 3.4 POWDER ORAL at 17:56

## 2017-01-12 RX ADMIN — SODIUM CHLORIDE 40 MG: 9 INJECTION, SOLUTION INTRAMUSCULAR; INTRAVENOUS; SUBCUTANEOUS at 20:25

## 2017-01-12 RX ADMIN — HYDROMORPHONE HYDROCHLORIDE 1 MG: 1 INJECTION, SOLUTION INTRAMUSCULAR; INTRAVENOUS; SUBCUTANEOUS at 20:25

## 2017-01-12 RX ADMIN — Medication 10 ML: at 06:44

## 2017-01-12 RX ADMIN — ACETAMINOPHEN 650 MG: 325 TABLET ORAL at 17:24

## 2017-01-12 RX ADMIN — SODIUM CHLORIDE 40 MG: 9 INJECTION, SOLUTION INTRAMUSCULAR; INTRAVENOUS; SUBCUTANEOUS at 08:41

## 2017-01-12 RX ADMIN — Medication 10 ML: at 20:25

## 2017-01-12 RX ADMIN — Medication 10 ML: at 00:59

## 2017-01-12 NOTE — ED NOTES
Nursing supervisor aware floor refusing to take report at this time. Attempting to allow offgoing nurse to give report prior to leaving shift for continuity of care.

## 2017-01-12 NOTE — ED NOTES
Bedside shift change report given to Ladarius Taylor RN (oncoming nurse) by [facility]. Galileo Wyman RN (offgoing nurse). Report included the following information SBAR, ED Summary, MAR, Recent Results and Alarm Parameters . Assumed pt care Pt noted to be resting comfortably. Pt updated on plan of care, has no questions or concerns at this time.

## 2017-01-12 NOTE — ED NOTES
Called tele floor to give report,but monitor tach,said no nurses at the nursing station and she don't know who is the accepting nurse. she going to call back. Charge nurse made aware.

## 2017-01-12 NOTE — ED NOTES
Bedside and Verbal shift change report given to [address] Dominic Velasco (oncoming nurse) by Blanquita Graham RN (offgoing nurse). Report included the following information SBAR.

## 2017-01-12 NOTE — ROUTINE PROCESS
TRANSFER - OUT REPORT:    Verbal report given to DUNG Wick RN on [facility]  being transferred to [facility] for routine progression of care       Report consisted of patients Situation, Background, Assessment and   Recommendations(SBAR). Information from the following report(s) SBAR, ED Summary, [facility] and Recent Results was reviewed with the receiving nurse. Lines:   Peripheral IV 01/11/17 Right Forearm (Active)        Opportunity for questions and clarification was provided.       Patient transported with:   [employer]

## 2017-01-12 NOTE — CONSULTS
Patient seen at request of Dr. Ivonne Wade. Roberto Avalos is an 46 y.o. male who was recently admitted with abdominal pain. Mr. Boaz العراقي tells me that he began feeling sick at HD on 1/11/2017. Denies nausea or vomitting. No fevers or chills. He has chronic diarrhea due to short gut (Approx. 80cm small bowel - According to records from [facility]). No melena or blood per rectum. Denies chest pain or shortness of breath. CT Scan abd/pelvis with po/IV contrast - Distal small bowel dilatation likely representing partial obstruction. Moderate to marked gastric distension with enteric contents No ascites. No free air. Allergies - Review of patient's allergies indicates no known allergies. Meds - Reviewed.     PMH -   Past Medical History   Diagnosis Date    Abscess of abdominal cavity ([address] 7/12/2016    Altered bowel elimination due to intestinal ostomy ([address] 7/14/2016    Altered bowel elimination due to intestinal ostomy ([address] 7/14/2016    Azotemia 7/12/2016    Chronic combined systolic and diastolic congestive heart failure (HCC) EF 45-50% 7/12/2016    Chronic pancreatitis ([address] 7/14/2016    Chronic pancreatitis ([address] 7/14/2016    ESRD (end stage renal disease) on dialysis ([address] M,W,F 11/16/2015    Essential hypertension with goal blood pressure less than 140/90 8/11/2016    History of DVT (deep vein thrombosis) 7/14/2016    History of DVT (deep vein thrombosis) 7/14/2016    Hx SBO 8/11/2016    Hypertension     Ileostomy in place ([address] 8/11/2016    Ill-defined condition      renal failure    LVH (left ventricular hypertrophy) 8/11/2016    On total parenteral nutrition (TPN) 7/14/2016    On total parenteral nutrition (TPN) 7/14/2016    Partial small bowel obstruction ([address] 1/12/2017    Renal transplant failure and rejection 8/11/2016    S/P IVC filter 7/14/2016    S/P IVC filter 7/14/2016    S/P small bowel resection 8/11/2016    Severe protein-calorie malnutrition Zoanne Lars: less than 60% of standard weight) ([facility] 12/10/2015    Small bowel perforation ([facility] 8/11/2016    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 8/11/2016     PSH -   Past Surgical History   Procedure Laterality Date    Hx other surgical       fistula left arm    Hx transplant       kidney 1.5 years ago; removed after 1 month due to complications    Hx gi       peritoneal dialysis catheter placement    Hx gi       removal of PD catheter     Fam Hx -   Family History   Problem Relation Age of Onset    No Known Problems Mother     Cancer Father     No Known Problems Sister      Soc Hx -   Social History   Substance Use Topics    Smoking status: Never Smoker    Smokeless tobacco: Not on file    Alcohol use No     Patient is a thin man in no acute distress. Visit Vitals    BP (!) 143/96 (BP 1 Location: Right arm)  Comment: 30 minutes post-transfusion vitals    Pulse 89  Comment: 30 minutes post-transfusion vitals    Temp 98.7 °F (37.1 °C)  Comment: 30 minutes post-transfusion vitals     Resp 18  Comment: 30 minutes post-transfusion vitals    Ht 5' 8\" (1.727 m)    Wt 118 lb (53.5 kg)    SpO2 98%    BMI 17.94 kg/m2     HEENT: Anicteric. Neck: Supple without Lymphadenopathy. Cor: RRR. Lungs: CTA Bilat. Abd: Soft. Slightly distended. Tender. No rebound or guarding. Multiple well healed surgical scars. Ext: No edema.   Neuro: Grossly Non focal.     Labs -   Recent Results (from the past 24 hour(s))   CBC WITH AUTOMATED DIFF    Collection Time: 01/11/17  3:19 PM   Result Value Ref Range    WBC 13.1 (H) 4.1 - 11.1 K/uL    RBC 2.56 (L) 4.10 - 5.70 M/uL    HGB 7.3 (L) 12.1 - 17.0 g/dL    HCT 23.1 (L) 36.6 - 50.3 %    MCV 90.2 80.0 - 99.0 FL    MCH 28.5 26.0 - 34.0 PG    MCHC 31.6 30.0 - 36.5 g/dL    RDW 14.5 11.5 - 14.5 %    PLATELET 471 702 - 992 K/uL    NEUTROPHILS 81 (H) 32 - 75 %    LYMPHOCYTES 6 (L) 12 - 49 %    MONOCYTES 11 5 - 13 %    EOSINOPHILS 2 0 - 7 %    BASOPHILS 0 0 - 1 %    ABS. NEUTROPHILS 10.6 (H) 1.8 - 8.0 K/UL    ABS. LYMPHOCYTES 0.8 0.8 - 3.5 K/UL    ABS. MONOCYTES 1.4 (H) 0.0 - 1.0 K/UL    ABS. EOSINOPHILS 0.3 0.0 - 0.4 K/UL    ABS. BASOPHILS 0.0 0.0 - 0.1 K/UL    DF MANUAL      RBC COMMENTS ANISOCYTOSIS  2+        RBC COMMENTS HYPOCHROMIA  2+       METABOLIC PANEL, COMPREHENSIVE    Collection Time: 01/11/17  3:19 PM   Result Value Ref Range    Sodium 142 136 - 145 mmol/L    Potassium 3.6 3.5 - 5.1 mmol/L    Chloride 101 97 - 108 mmol/L    CO2 28 21 - 32 mmol/L    Anion gap 13 5 - 15 mmol/L    Glucose 100 65 - 100 mg/dL    BUN 32 (H) 6 - 20 MG/DL    Creatinine 7.77 (H) 0.70 - 1.30 MG/DL    BUN/Creatinine ratio 4 (L) 12 - 20      GFR est AA 9 (L) >60 ml/min/1.73m2    GFR est non-AA 7 (L) >60 ml/min/1.73m2    Calcium 8.2 (L) 8.5 - 10.1 MG/DL    Bilirubin, total 0.7 0.2 - 1.0 MG/DL    ALT 21 12 - 78 U/L    AST 17 15 - 37 U/L    Alk.  phosphatase 92 45 - 117 U/L    Protein, total 6.9 6.4 - 8.2 g/dL    Albumin 2.8 (L) 3.5 - 5.0 g/dL    Globulin 4.1 (H) 2.0 - 4.0 g/dL    A-G Ratio 0.7 (L) 1.1 - 2.2     LIPASE    Collection Time: 01/11/17  3:19 PM   Result Value Ref Range    Lipase 157 73 - 393 U/L   FERRITIN    Collection Time: 01/11/17  3:19 PM   Result Value Ref Range    Ferritin 1620 (H) 26 - 388 NG/ML   TYPE & SCREEN    Collection Time: 01/11/17  3:59 PM   Result Value Ref Range    Crossmatch Expiration 01/14/2017     ABO/Rh(D) A POSITIVE     Antibody screen NEG     Unit number [id number]     Blood component type RC LR AS1     Unit division 00     Status of unit ISSUED     Crossmatch result Compatible     Unit number [id number]     Blood component type RC LR AS1     Unit division 00     Status of unit ISSUED     Crossmatch result Compatible    OCCULT BLOOD, STOOL    Collection Time: 01/11/17  4:09 PM   Result Value Ref Range    Occult blood, stool POSITIVE (A) NEG     EKG, 12 LEAD, INITIAL    Collection Time: 01/11/17  4:15 PM   Result Value Ref Range    Ventricular Rate 107 BPM    Atrial Rate 107 BPM    P-R Interval 142 ms    QRS Duration 84 ms    Q-T Interval 396 ms    QTC Calculation (Bezet) 528 ms    Calculated P Axis 58 degrees    Calculated R Axis -15 degrees    Calculated T Axis 121 degrees    Diagnosis       Sinus tachycardia with occasional premature ventricular complexes  Voltage criteria for left ventricular hypertrophy  T wave abnormality, consider lateral ischemia  Prolonged QT  Abnormal ECG  When compared with ECG of 17-NOV-2015 09:42,  premature ventricular complexes are now present  T wave inversion more evident in Anterolateral leads     WBC, STOOL    Collection Time: 01/12/17  5:05 AM   Result Value Ref Range    White blood cells, stool 0 TO 4 0 - 4 /HPF     CT Scan - Reviewed. Imp: Pt. Is a 46 y.o. male with abdominal pain and partial SBO. Plan: 1. At this point in time, do not believe that there is an indication for surgical intervention. 2. Would leave NG out for now. 3. Clear liquids as tolerated. 4. Gentle IV hydration. 5. HD per Nephrology. 6. C diff is pending. 7. Plans per Dr. Aquino Seats. Following.

## 2017-01-12 NOTE — PROGRESS NOTES
1250) Bedside shift change report given to Sarah Shay, RN (oncoming nurse) by Carmen Valdivia RN (offgoing nurse). Report included the following information SBAR, Kardex, MAR, Accordion, Recent Results and Cardiac Rhythm NS.   1840) Consult Dr. Elsa Stein, pt refuse IV magnesium  1920) Bedside shift change report given to Rosie Simms RN (oncoming nurse) by Mohan Willingham (offgoing nurse). Report included the following information SBAR, Kardex, Intake/Output, MAR, Accordion, Recent Results and Cardiac Rhythm NS.

## 2017-01-12 NOTE — PROGRESS NOTES
Hospitalist Progress Note   Patient Name  Swapna White date:  1/11/2017   Medical Record Number  [id number]    Age  46 y.o. Date of Birth 1965   PCP Kelsey Simental MD    Room Number  207/01 [facility]     Admission Diagnoses:  Abdominal pain     Assessment/ Plan:     Partial SBO  -CT abdo/pelvis showing Partial SBO  -CT Abdomen 1/11: There is distal small bowel dilatation likely representing partial obstruction. There is moderate to marked gastric distention with enteric contents  -has been seen by general surgery, no intervention indicated  -pt now tolerating orally     Anemia  -hemoglobin from 7.3-> 9.3 post BTx 2  -ferritin  -SCDs     ESRD, uncontrolled HTN  -continue home meds, pain meds     Leucocytosis  -will observe     GI Bleed  Short gut syndrome  -FOBT positive  -GI consult  -PPI     Hypomagnesemia  -supplementing    Diarrhea  -WCC < 5, c/w chronic secretory diarrhea    LYLE:    Principal Problem:    Abdominal pain (12/14/2015)    Active Problems:    ESRD (end stage renal disease) on dialysis ([facility] M,W,F (11/16/2015)      Hx SBO (8/11/2016)      GIB (gastrointestinal bleeding) (1/11/2017)      Partial small bowel obstruction (HCC) (1/12/2017)      Body mass index is 17.94 kg/(m^2).     Functional Status  poor   CODE STATUS  Full   Surrogate decision maker: Pt is competent   Prophylaxis  SCD's   Discharge Plan: Home w/Family,    Contact Payor: [facility] MEDICARE / Plan: [place] MEDICARE PART A & B / Product Type: Medicare /    Social issues  Date Comment              Prognosis              Subjective:    \"Excellent after getting blood\"    Pt has decrease abd pain, tolerated his lunch, and is able to ambulate better today, less fatigue compared to previously      Objective:     Physical Exam:    Gen: lying in bed, lying on side, still in pain\  HEENT:  Pink conjunctivae, PERRL, hearing intact to voice, moist mucous membranes  Neck:  Supple, without masses, thyroid non-tender  Resp:  No accessory muscle use, clear breath sounds without wheezes rales or rhonchi  Card:  No murmurs, normal S1, S2 without thrills, bruits or peripheral edema  Abd:  Soft, mild upper abd tenderness  Lymph:  No cervical adenopathy  Musc:  No cyanosis or clubbing  Skin:  No rashes or ulcers, skin turgor is good  Neuro:  Cranial nerves 3-12 are grossly intact,  strength is 5/5 bilaterally, dorsi / plantarflexion strength is 5/5 bilaterally, follows commands appropriately  Psych:  Alert with good insight. Oriented to person, place, and time            Intake/Output Summary (Last 24 hours) at 01/13/17 0025  Last data filed at 01/12/17 1057   Gross per 24 hour   Intake            787.5 ml   Output                0 ml   Net            787.5 ml    Weight change: Wt Readings from Last 10 Encounters:   01/12/17 53.5 kg (118 lb)   12/05/16 52 kg (114 lb 9.6 oz)   11/29/16 52.4 kg (115 lb 9.6 oz)   07/12/16 44.5 kg (98 lb)   12/17/15 53.7 kg (118 lb 6.2 oz)   12/08/15 54.8 kg (120 lb 14.4 oz)   11/29/15 61.7 kg (136 lb)   11/20/15 60.7 kg (133 lb 14.4 oz)        Relevant informations: Other medical conditions listed in this following active hospital problem list section; all of these and other pertinent data were taken into consideration when treatment plan is developed and customized to this patient's unique overall circumstances and needs.    Patient Active Problem List    Diagnosis Date Noted    Partial small bowel obstruction ([address] 01/12/2017    GIB (gastrointestinal bleeding) 01/11/2017    Pancreatitis 12/01/2016    Hx SBO 08/11/2016    Small bowel perforation ([address] 08/11/2016    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 08/11/2016    Renal transplant failure and rejection 08/11/2016    Essential hypertension 08/11/2016    S/P small bowel resection 08/11/2016    Ileostomy in place [facility]) 08/11/2016    LVH (left ventricular hypertrophy) 08/11/2016    On total parenteral nutrition (TPN) 07/14/2016    Chronic pancreatitis ([facility] 07/14/2016    Altered bowel elimination due to intestinal ostomy ([facility] 07/14/2016    History of DVT (deep vein thrombosis) 07/14/2016    S/P IVC filter 07/14/2016    Anemia in chronic kidney disease 07/12/2016    Status post laparotomy 07/12/2016    Abscess of abdominal cavity ([facility] 07/12/2016    Vitamin D deficiency 07/12/2016    History of acute pancreatitis 07/12/2016    Steal syndrome as complication of dialysis access [facility] 07/12/2016    Azotemia 07/12/2016    Chronic combined systolic and diastolic congestive heart failure (HCC) EF 45-50% 07/12/2016    Abdominal pain 12/14/2015    Severe protein-calorie malnutrition Tatianna Sable: less than 60% of standard weight) ([facility] 12/10/2015    HTN (hypertension) 11/16/2015    ESRD (end stage renal disease) on dialysis ([facility] M,W,F 11/16/2015    Small bowel obstruction ([facility] 11/16/2015          Data Review:   Recent Days:  Recent Labs      01/12/17   1217  01/11/17   1519   WBC  12.3*  13.1*   HGB  9.3*  7.3*   HCT  28.9*  23.1*   PLT  269  278     Recent Labs      01/12/17 1217  01/11/17   1519   NA  137  142   K  4.7  3.6   CL  100  101   CO2  21  28   GLU  78  100   BUN  40*  32*   CREA  9.26*  7.77*   CA  8.4*  8.2*   MG  1.4*   --    PHOS  8.5*   --    ALB  2.9*  2.8*   TBILI  1.3*  0.7   SGOT  18  17   ALT  21  21     No results found for: TSH, TSHEXT, TSHEXT  ______________________________________________________________________________________________________    Medications reviewed     Current Facility-Administered Medications   Medication Dose Route Frequency    labetalol (NORMODYNE) tablet 200 mg  200 mg Oral Q12H    amLODIPine (NORVASC) tablet 10 mg  10 mg Oral DAILY    magnesium sulfate 1 g/100 ml IVPB (premix or compounded)  1 g IntraVENous ONCE    sodium chloride (NS) flush 5-10 mL  5-10 mL IntraVENous Q8H    sodium chloride (NS) flush 5-10 mL  5-10 mL IntraVENous PRN    B complex-vitaminC-folic acid (NEPHROCAP) cap  1 Cap Oral DAILY    ergocalciferol (ERGOCALCIFEROL) capsule 50,000 Units  50,000 Units Oral Q7D    HYDROmorphone (DILAUDID) tablet 2 mg  2 mg Oral Q4H PRN    psyllium husk-aspartame (METAMUCIL FIBER) packet 1 Packet  1 Packet Oral BID    0.9% sodium chloride infusion 250 mL  250 mL IntraVENous PRN    sodium chloride (NS) flush 5-10 mL  5-10 mL IntraVENous Q8H    sodium chloride (NS) flush 5-10 mL  5-10 mL IntraVENous PRN    acetaminophen (TYLENOL) tablet 650 mg  650 mg Oral Q4H PRN    ondansetron (ZOFRAN) injection 4 mg  4 mg IntraVENous Q4H PRN    docusate sodium (COLACE) capsule 100 mg  100 mg Oral BID    zolpidem (AMBIEN) tablet 5 mg  5 mg Oral QHS PRN    pantoprazole (PROTONIX) 40 mg in sodium chloride 0.9 % 10 mL injection  40 mg IntraVENous Q12H    HYDROmorphone (PF) (DILAUDID) injection 1 mg  1 mg IntraVENous Q4H PRN       ______________________________________________________________________________________________________  Care Plan discussed with: Patient/Family  ____________________________________________________________________________________________________    High complexity decision making was performed for this patient who is at high risk for decompensation with multiple organ involvement. Today total floor/unit time was 25 minutes while caring for this patient and greater than 50% of that time was spent with patient (and/or family) discussing patients clinical issues.   ______________________________________________________________________________________________________  Efrain Cavazos MD                      1/13/2017

## 2017-01-12 NOTE — PROGRESS NOTES
1957  TRANSFER - IN REPORT:    Verbal report received from [address] RN(name) on Flaco Stevenson  being received from ED (unit) for routine progression of care      Report consisted of patients Situation, Background, Assessment and   Recommendations(SBAR). Information from the following report(s) SBAR, Kardex, ED Summary, Intake/Output, MAR, Accordion, Recent Results and Cardiac Rhythm Sinus Tachycardia was reviewed with the receiving nurse. Opportunity for questions and clarification was provided. Assessment completed upon patients arrival to unit and care assumed. 2010 Patient arrived to unit via stretcher. ..    2150 Spoke with Dr. Kenyatta Trevino regarding the fact that the patient does not produce any urine. Order obtained to discontinue IV lasix 80 mg one time dose. [facility] with on call radiology tech, Nirali Calloway, who asked that gastroview 30 ml be ordered per radiologist request for STAT abdominal CT.    2251 Patient started drinking gastroview    0227 Notified Dr. Joanne Jimenez, telehospitalist, of abdominal CT results and at present patient neither nauseated or vomiting.    0228 No new orders obtained at this time. 1515 [facility], Dr. Joanne Jimenez, that patient had 14 beats of nonsustained SVT. Patient asymptomatic, resting in bed, playing game on phone. 0810 Second unit of PRBCs started and blood has reached IV access in tubing. ..

## 2017-01-12 NOTE — PROGRESS NOTES
RRAT Score: 15     Initial Assessment: CM reviewed chart and met with patient for discharge planning. CM verified patients address and contact number as correct on the facesheet. Pt presented to ED via with c/o abdominal.  Patient is currently living with his mother. Patient is disable and receiving $1127/mth. Rojas Hauser PCP Dr. Toni Sims. Patient has not kept any PCP appointment with Dr. Toni Sims. Patient consented for CM to make appointment arrangements. Cm has made appointment with Dr. Dmitri Casper NP on 01/24/17 at 9:30 am. Patient is an HD  with [facility] on    [address], [place], [address] [phone] . Patient has [place] Medicare. Patient will not need assistance with obtaining medications. Emergency Contact:  Floridalma nolasco @ [phone]    Moderate Risk Care Transition Plan: Home with hospital follow up appointment. Evaluate for [facility] or [facility], SNF, acute rehab, community care coordination of resources. CM will further assess if needed. Involve patient/caregiver in assessment, planning, education and implement of intervention. Yes. CM will continue to follow case for discharge planning. CM daily patient care huddles/interdisciplinary rounds. Rounded with IDT. PCP/Specialist appointment within 5  7 days made prior to discharge. Yes . Dr. Dmitri Casper NP on 01/24/2017 at 9:30 am.    Facilitate transportation and logistics for follow-up appointments. Floridalma nolasco @[phone]. Handoff to [address] [place] [address] Nurse Navigator or PCP practice. writer will send discharge information to [employer] @ [phone]. Care Management Interventions  PCP Verified by CM:  Yes  Palliative Care Consult (Criteria: CHF and RRAT>21): No  Reason for No Palliative Care Consult: Attending provider declined consult request  Mode of Transport at Discharge: Self  Transition of Care Consult (CM Consult): Discharge Planning  Discharge Durable Medical Equipment: No  Physical Therapy Consult: No  Occupational Therapy Consult: No  Speech Therapy Consult: No  Current Support Network: Relative's Home  Confirm Follow Up Transport: Family  Plan discussed with Pt/Family/Caregiver: Yes  Discharge Location  Discharge Placement: Home with outpatient services   [address] RN CM

## 2017-01-13 VITALS
OXYGEN SATURATION: 99 % | DIASTOLIC BLOOD PRESSURE: 97 MMHG | BODY MASS INDEX: 18.51 KG/M2 | RESPIRATION RATE: 18 BRPM | HEIGHT: 68 IN | SYSTOLIC BLOOD PRESSURE: 179 MMHG | HEART RATE: 95 BPM | WEIGHT: 122.14 LBS | TEMPERATURE: 96.1 F

## 2017-01-13 LAB
ABO + RH BLD: NORMAL
ANION GAP BLD CALC-SCNC: 18 MMOL/L (ref 5–15)
BASOPHILS # BLD AUTO: 0 K/UL (ref 0–0.1)
BASOPHILS # BLD: 0 % (ref 0–1)
BLD PROD TYP BPU: NORMAL
BLD PROD TYP BPU: NORMAL
BLOOD GROUP ANTIBODIES SERPL: NORMAL
BPU ID: NORMAL
BPU ID: NORMAL
BUN SERPL-MCNC: 48 MG/DL (ref 6–20)
BUN/CREAT SERPL: 5 (ref 12–20)
CALCIUM SERPL-MCNC: 8.3 MG/DL (ref 8.5–10.1)
CHLORIDE SERPL-SCNC: 101 MMOL/L (ref 97–108)
CO2 SERPL-SCNC: 20 MMOL/L (ref 21–32)
CREAT SERPL-MCNC: 10.4 MG/DL (ref 0.7–1.3)
CROSSMATCH RESULT,%XM: NORMAL
CROSSMATCH RESULT,%XM: NORMAL
CRP SERPL-MCNC: 3.72 MG/DL (ref 0–0.6)
EOSINOPHIL # BLD: 0.4 K/UL (ref 0–0.4)
EOSINOPHIL NFR BLD: 3 % (ref 0–7)
ERYTHROCYTE [DISTWIDTH] IN BLOOD BY AUTOMATED COUNT: 15 % (ref 11.5–14.5)
GLUCOSE SERPL-MCNC: 90 MG/DL (ref 65–100)
HCT VFR BLD AUTO: 28.7 % (ref 36.6–50.3)
HGB BLD-MCNC: 9.3 G/DL (ref 12.1–17)
LYMPHOCYTES # BLD AUTO: 8 % (ref 12–49)
LYMPHOCYTES # BLD: 1 K/UL (ref 0.8–3.5)
MCH RBC QN AUTO: 28.7 PG (ref 26–34)
MCHC RBC AUTO-ENTMCNC: 32.4 G/DL (ref 30–36.5)
MCV RBC AUTO: 88.6 FL (ref 80–99)
MONOCYTES # BLD: 1.2 K/UL (ref 0–1)
MONOCYTES NFR BLD AUTO: 10 % (ref 5–13)
NEUTS SEG # BLD: 10 K/UL (ref 1.8–8)
NEUTS SEG NFR BLD AUTO: 79 % (ref 32–75)
PLATELET # BLD AUTO: 297 K/UL (ref 150–400)
POTASSIUM SERPL-SCNC: 4.7 MMOL/L (ref 3.5–5.1)
RBC # BLD AUTO: 3.24 M/UL (ref 4.1–5.7)
SODIUM SERPL-SCNC: 139 MMOL/L (ref 136–145)
SPECIMEN EXP DATE BLD: NORMAL
STATUS OF UNIT,%ST: NORMAL
STATUS OF UNIT,%ST: NORMAL
UNIT DIVISION, %UDIV: 0
UNIT DIVISION, %UDIV: 0
WBC # BLD AUTO: 12.7 K/UL (ref 4.1–11.1)

## 2017-01-13 PROCEDURE — 80048 BASIC METABOLIC PNL TOTAL CA: CPT | Performed by: STUDENT IN AN ORGANIZED HEALTH CARE EDUCATION/TRAINING PROGRAM

## 2017-01-13 PROCEDURE — 74011250637 HC RX REV CODE- 250/637: Performed by: STUDENT IN AN ORGANIZED HEALTH CARE EDUCATION/TRAINING PROGRAM

## 2017-01-13 PROCEDURE — 74011250636 HC RX REV CODE- 250/636: Performed by: STUDENT IN AN ORGANIZED HEALTH CARE EDUCATION/TRAINING PROGRAM

## 2017-01-13 PROCEDURE — 82139 AMINO ACIDS QUAN 6 OR MORE: CPT | Performed by: STUDENT IN AN ORGANIZED HEALTH CARE EDUCATION/TRAINING PROGRAM

## 2017-01-13 PROCEDURE — 36415 COLL VENOUS BLD VENIPUNCTURE: CPT | Performed by: STUDENT IN AN ORGANIZED HEALTH CARE EDUCATION/TRAINING PROGRAM

## 2017-01-13 PROCEDURE — 86140 C-REACTIVE PROTEIN: CPT | Performed by: STUDENT IN AN ORGANIZED HEALTH CARE EDUCATION/TRAINING PROGRAM

## 2017-01-13 PROCEDURE — 74011000250 HC RX REV CODE- 250: Performed by: STUDENT IN AN ORGANIZED HEALTH CARE EDUCATION/TRAINING PROGRAM

## 2017-01-13 PROCEDURE — C9113 INJ PANTOPRAZOLE SODIUM, VIA: HCPCS | Performed by: STUDENT IN AN ORGANIZED HEALTH CARE EDUCATION/TRAINING PROGRAM

## 2017-01-13 PROCEDURE — 85025 COMPLETE CBC W/AUTO DIFF WBC: CPT | Performed by: STUDENT IN AN ORGANIZED HEALTH CARE EDUCATION/TRAINING PROGRAM

## 2017-01-13 RX ADMIN — AMLODIPINE BESYLATE 10 MG: 5 TABLET ORAL at 08:10

## 2017-01-13 RX ADMIN — ACETAMINOPHEN 650 MG: 325 TABLET ORAL at 14:22

## 2017-01-13 RX ADMIN — ACETAMINOPHEN 650 MG: 325 TABLET ORAL at 08:04

## 2017-01-13 RX ADMIN — Medication 10 ML: at 13:36

## 2017-01-13 RX ADMIN — HYDROMORPHONE HYDROCHLORIDE 1 MG: 1 INJECTION, SOLUTION INTRAMUSCULAR; INTRAVENOUS; SUBCUTANEOUS at 02:40

## 2017-01-13 RX ADMIN — PSYLLIUM HUSK 1 PACKET: 3.4 POWDER ORAL at 08:09

## 2017-01-13 RX ADMIN — Medication 10 ML: at 08:11

## 2017-01-13 RX ADMIN — HYDROMORPHONE HYDROCHLORIDE 1 MG: 1 INJECTION, SOLUTION INTRAMUSCULAR; INTRAVENOUS; SUBCUTANEOUS at 10:21

## 2017-01-13 RX ADMIN — LABETALOL HYDROCHLORIDE 200 MG: 200 TABLET, FILM COATED ORAL at 08:10

## 2017-01-13 RX ADMIN — ACETAMINOPHEN 650 MG: 325 TABLET ORAL at 02:40

## 2017-01-13 RX ADMIN — HYDROMORPHONE HYDROCHLORIDE 1 MG: 1 INJECTION, SOLUTION INTRAMUSCULAR; INTRAVENOUS; SUBCUTANEOUS at 14:22

## 2017-01-13 RX ADMIN — SODIUM CHLORIDE 40 MG: 9 INJECTION, SOLUTION INTRAMUSCULAR; INTRAVENOUS; SUBCUTANEOUS at 08:09

## 2017-01-13 RX ADMIN — Medication 10 ML: at 02:40

## 2017-01-13 RX ADMIN — RENO CAPS 1 CAPSULE: 100; 1.5; 1.7; 20; 10; 1; 150; 5; 6 CAPSULE ORAL at 08:10

## 2017-01-13 NOTE — CDMP QUERY
1. Please clarify if this patient is being treated/managed for:    =>Acute on Chronic Blood loss Anemia in the setting of HGB 7.3/9.3; transfused 2 units  =>Other Explanation of clinical findings  =>Unable to Determine (no explanation of clinical findings)    The medical record reflects the following clinical findings, treatment, and risk factors:    Risk Factors: Anemia of CKD; Admission for GIB     Clinical Indicators:   Baseline: 12/4 HGB 11.5  1/11 HGB 7.3  1/12 HGB 9.3    Treatment: GI consult; transfuse 2 units; iron supplements IVF as ordered; PPI    Please clarify and document your clinical opinion in the progress notes and discharge summary including the definitive and/or presumptive diagnosis, (suspected or probable), related to the above clinical findings. Please include clinical findings supporting your diagnosis.     Thank you,  Stephanie Montalvo, MSN, [facility], [facility]  [phone]

## 2017-01-13 NOTE — DISCHARGE INSTRUCTIONS
Bowel Blockage (Intestinal Obstruction): Care Instructions  Your Care Instructions  A bowel blockage, also called an intestinal obstruction, can prevent gas, fluids, or solids from moving through the intestines normally. It can cause constipation and, rarely, diarrhea. You may have pain, nausea, vomiting, and cramping. Most of the time, complete blockages require a stay in the hospital and possibly surgery. But if your bowel is only partly blocked, your doctor may tell you to wait until it clears on its own and you are able to pass gas and stool. If so, there are things you can do at home to help make you feel better. If you have had surgery for a bowel blockage, there are things you can do at home to make sure you heal well. You can also make some changes to keep your bowel from becoming blocked again. Follow-up care is a key part of your treatment and safety. Be sure to make and go to all appointments, and call your doctor if you are having problems. It's also a good idea to know your test results and keep a list of the medicines you take. How can you care for yourself at home? If your doctor has told you to wait at home for a blockage to clear on its own:  · Follow your doctor's instructions. These may include eating a liquid diet to avoid complete blockage. · Take your medicines exactly as prescribed. Call your doctor if you think you are having a problem with your medicine. · Put a heating pad set on low on your belly to relieve mild cramps and pain. To prevent another blockage  · Try to eat smaller amounts of food more often. For example, have 5 or 6 small meals throughout the day instead of 2 or 3 large meals. · Chew your food very well. Try to chew each bite about 20 times or until it is liquid. · Avoid high-fiber foods and raw fruits and vegetables with skins, husks, strings, or seeds.  These can form a ball of undigested material that can cause a blockage if a part of your bowel is scarred or narrowed. · Check with your doctor before you eat whole-grain products or use a fiber supplement such as Citrucel or Metamucil. · To help you have regular bowel movements, eat at regular times, do not strain during a bowel movement, and drink at least 8 to 10 glasses of water each day. If you have kidney, heart, or liver disease and have to limit fluids, talk with your doctor or before you increase the amount of fluids you drink. · Drink high-calorie liquid formulas if your doctor says to. Severe symptoms may make it hard for your body to take in vitamins and minerals. · Get regular exercise. It helps you digest your food better. Get at least 30 minutes of physical activity on most days of the week. Walking is a good choice. When should you call for help? Call 911 anytime you think you may need emergency care. For example, call if:  · You passed out (lost consciousness). · You have severe pain or swelling in your belly. · You vomit blood or what looks like coffee grounds. · You pass maroon or very bloody stools. · You cannot pass any stools or gas. Call your doctor now or seek immediate medical care if:  · You have a new or higher fever. · You cannot keep fluids or medicines down. · You have new pain that gets worse when you move or cough. · Your symptoms become much worse than usual.  Watch closely for changes in your health, and be sure to contact your doctor if:  · You do not get better as expected. · You have steady diarrhea for more than 2 weeks. · You've been losing weight. Where can you learn more? Go to http://jenn-ellen.info/. Enter K220 in the search box to learn more about \"Bowel Blockage (Intestinal Obstruction): Care Instructions. \"  Current as of: August 9, 2016  Content Version: 11.1  © 4832-4274 [employer]. Care instructions adapted under license by [employer] (which disclaims liability or warranty for this information).  If you have questions about a medical condition or this instruction, always ask your healthcare professional. [address] any warranty or liability for your use of this information. Abdominal Pain: Care Instructions  Your Care Instructions    Abdominal pain has many possible causes. Some aren't serious and get better on their own in a few days. Others need more testing and treatment. If your pain continues or gets worse, you need to be rechecked and may need more tests to find out what is wrong. You may need surgery to correct the problem. Don't ignore new symptoms, such as fever, nausea and vomiting, urination problems, pain that gets worse, and dizziness. These may be signs of a more serious problem. Your doctor may have recommended a follow-up visit in the next 8 to 12 hours. If you are not getting better, you may need more tests or treatment. The doctor has checked you carefully, but problems can develop later. If you notice any problems or new symptoms, get medical treatment right away. Follow-up care is a key part of your treatment and safety. Be sure to make and go to all appointments, and call your doctor if you are having problems. It's also a good idea to know your test results and keep a list of the medicines you take. How can you care for yourself at home? · Rest until you feel better. · To prevent dehydration, drink plenty of fluids, enough so that your urine is light yellow or clear like water. Choose water and other caffeine-free clear liquids until you feel better. If you have kidney, heart, or liver disease and have to limit fluids, talk with your doctor before you increase the amount of fluids you drink. · If your stomach is upset, eat mild foods, such as rice, dry toast or crackers, bananas, and applesauce. Try eating several small meals instead of two or three large ones.   · Wait until 48 hours after all symptoms have gone away before you have spicy foods, alcohol, and drinks that contain caffeine. · Do not eat foods that are high in fat. · Avoid anti-inflammatory medicines such as aspirin, ibuprofen (Advil, Motrin), and naproxen (Aleve). These can cause stomach upset. Talk to your doctor if you take daily aspirin for another health problem. When should you call for help? Call 911 anytime you think you may need emergency care. For example, call if:  · You passed out (lost consciousness). · You pass maroon or very bloody stools. · You vomit blood or what looks like coffee grounds. · You have new, severe belly pain. Call your doctor now or seek immediate medical care if:  · Your pain gets worse, especially if it becomes focused in one area of your belly. · You have a new or higher fever. · Your stools are black and look like tar, or they have streaks of blood. · You have unexpected vaginal bleeding. · You have symptoms of a urinary tract infection. These may include:  ¨ Pain when you urinate. ¨ Urinating more often than usual.  ¨ Blood in your urine. · You are dizzy or lightheaded, or you feel like you may faint. Watch closely for changes in your health, and be sure to contact your doctor if:  · You are not getting better after 1 day (24 hours). Where can you learn more? Go to http://jenn-ellen.info/. Enter K904 in the search box to learn more about \"Abdominal Pain: Care Instructions. \"  Current as of: May 27, 2016  Content Version: 11.1  © 1466-9619 [employer]. Care instructions adapted under license by [employer] (which disclaims liability or warranty for this information). If you have questions about a medical condition or this instruction, always ask your healthcare professional. [address] any warranty or liability for your use of this information.

## 2017-01-13 NOTE — PROGRESS NOTES
Spoke with [facility] [place] [facility] re: ordered serum citrulline level. Told to order as amino acid profile quantitative plasma and draw in dark green top tube.

## 2017-01-13 NOTE — PROGRESS NOTES
1421hrs for Pharmacy, dilaudid 1mg was administered at this time. Incomplete transaction appeared on the emar as computer went down and out of battery when on the process of scanning medication. Scanned complete done on another computer.

## 2017-01-13 NOTE — CDMP QUERY
2. Please clarify if this patient is being treated/managed for:    =>Chronic Combined Systolic/Diastolic Heart Failure in the setting of PMH;   =>Other Explanation of clinical findings  =>Unable to Determine (no explanation of clinical findings)    The medical record reflects the following clinical findings, treatment, and risk factors:    Risk Factors: PMH of combined Systolic/Diastolic HF (per PMH)    Clinical Indicators: Echo 7/16 EF 45-50%    Treatment: Daily toprol     Please clarify and document your clinical opinion in the progress notes and discharge summary including the definitive and/or presumptive diagnosis, (suspected or probable), related to the above clinical findings. Please include clinical findings supporting your diagnosis.     Thank you,  Radha Kaur, MSN, [facility], [facility]  [phone]

## 2017-01-13 NOTE — PROGRESS NOTES
1400hrs Discharge instructions was discussed with patient, opportunity for questions and clarifications were provided. 1430hrs IV access was removed. 1500hrs Patient was discharged to home,  Picked up by his mother. Patient was alert and oriented. No complaint of any pain or distress. Wheeled patient to downstairs.

## 2017-01-13 NOTE — PROGRESS NOTES
1000hrs patient had a bowel movement, stool was moderate in quantity, soft and not watery. 1100hrs Blood transfusion was completed, no transfusion reaction was reported and noted. Tolerated well.

## 2017-01-13 NOTE — DISCHARGE SUMMARY
Physician Discharge Summary     Pt Name  Cheri Manuel date:  1/11/2017   Discharge date and time:  1/13/2017;   Room Number  207/01    Medical Record Number  [id number] @ [facility]   Age  46 y.o. Date of Birth 1965   PCP Thi Yo MD     Admission Diagnoses:                        Abdominal pain     Hospital Problems  Date Reviewed: 1/12/2017          Codes Class Noted POA    Partial small bowel obstruction ([address] ICD-10-CM: K56.69  ICD-9-CM: 560.9  1/12/2017 Unknown        GIB (gastrointestinal bleeding) ICD-10-CM: K92.2  ICD-9-CM: 578.9  1/11/2017 Unknown        Hx SBO ICD-10-CM: Z87.19  ICD-9-CM: V12.79  8/11/2016 Yes        * (Principal)Abdominal pain ICD-10-CM: R10.9  ICD-9-CM: 789.00  12/14/2015 Yes        ESRD (end stage renal disease) on dialysis [facility]) M,W,F (Chronic) ICD-10-CM: N18.6, Z99.2  ICD-9-CM: 585.6, V45.11  11/16/2015 Yes               No Known Allergies     Excerpt from HPI :   Mr. Daron Riggs is a known ESRD patient on dialysis, Monday Wednesday Friday. He also short gut syndrome following [place] [facility] [place]      After dialysis today he was not feeling well, his abdominal pain, which is chronic, at a 6, was up to 9. He was also having headache weakness and fatigue on exertion.     He has diarrhea but this is ongoing, brown in color, no blood. Mild nausea,no vomiting.      In the ER he was noted to have a hemoglobin of 7 . Last month he was admitted and transfuse to hemoglobin of 11 . FOBT pos. Hospital Course: This pt was admitted with  Abdominal pain on 1/11/2017. Partial SBO  -CT abdo/pelvis showing Partial SBO  -CT Abdomen 1/11: There is distal small bowel dilatation likely representing partial obstruction.   There is moderate to marked gastric distention with enteric contents  -has been seen by general surgery, no intervention indicated  -pt now tolerating orally      Acute on Chronic Blood loss Anemia  -secondary to slow GI bleeding  -has been seen by GI in the past, reviewed here by Dr Lindsey Hitchcock, he has had recent apparently neg endoscopy, no indication at this point  hemoglobin from 7.3-> 9.3 post BTx 2  -ferritin 1620, no need for more iron  -SCDs      ESRD, uncontrolled HTN  -continue home meds, pain meds      Leucocytosis  -stable, no sign of infection      GI Bleed  Short gut syndrome  -FOBT positive  -GI consult  -PPI      Hypomagnesemia  -supplementing     Diarrhea  -WCC < 5, c/w chronic secretory diarrhea  -C diff neg     Echo 7/16 EF 45-50%, not being treated for this on this admission     Condition at the time of discharge improved and stable .      Query:   Treatment team[de-identified] Treatment Team: Consulting Provider: Tej Das MD; Consulting Provider: Coleen Randolph MD; Utilization Review: Manpreet Shepherd; Consulting Provider: Osiris Powers MD; Care Manager: Jeromy Garvin RN       Other Pertinent data:   TODAY's CLINICAL FINDINGS:   Visit Vitals    BP (!) 179/97 (BP 1 Location: Right arm, BP Patient Position: Sitting)    Pulse 95    Temp 96.1 °F (35.6 °C)    Resp 18    Ht 5' 8\" (1.727 m)    Wt 55.4 kg (122 lb 2.2 oz)    SpO2 99%    BMI 18.57 kg/m2      Wt Readings from Last 10 Encounters:   01/13/17 55.4 kg (122 lb 2.2 oz)   12/05/16 52 kg (114 lb 9.6 oz)   11/29/16 52.4 kg (115 lb 9.6 oz)   07/12/16 44.5 kg (98 lb)   12/17/15 53.7 kg (118 lb 6.2 oz)   12/08/15 54.8 kg (120 lb 14.4 oz)   11/29/15 61.7 kg (136 lb)   11/20/15 60.7 kg (133 lb 14.4 oz)       Physical Exam:    Gen:  In mild to mod pain (chronic)  HEENT:  Pink conjunctivae, PERRL, hearing intact to voice, moist mucous membranes  Neck:  Supple, without masses, thyroid non-tender  Resp:  No accessory muscle use, clear breath sounds without wheezes rales or rhonchi  Card:  No murmurs, normal S1, S2 without thrills, bruits or peripheral edema  Abd:  Soft, minimally tender  Lymph:  No cervical adenopathy  Musc:  No cyanosis or clubbing  Skin:  No rashes or ulcers, skin turgor is good  Neuro:  Cranial nerves 3-12 are grossly intact,  strength is 5/5 bilaterally, dorsi / plantarflexion strength is 5/5 bilaterally, follows commands appropriately  Psych:  Alert with good insight. Oriented to person, place, and time       Disposition:Home. Diet: Regular Diet   Care Plan discussed with: Patient/Family   Follow up   Follow-up Information     Follow up With Details Comments [address], MD On 1/24/2017 PATSY Pina  at 9:30 am. please bring ID & insurance card. list of medications. [address]  [phone]      [facility]  please continue your HD appointment on M-W-F [address] [place]  [phone]         Discharge Medication List as of 1/13/2017 11:54 AM      CONTINUE these medications which have NOT CHANGED    Details   lipase-protease-amylase (CREON) 36,000-114,000- 180,000 unit cpDR Take 2 Caps by mouth three (3) times daily (with meals). and may also take one capsule with three snacks per day. Do not take more than 15 capsules per day., Historical Med      amLODIPine (NORVASC) 5 mg tablet Take 5 mg by mouth daily. , Historical Med      cholecalciferol, VITAMIN D3, (VITAMIN D3) 5,000 unit tab tablet Take 5,000 Units by mouth daily. , Historical Med      gabapentin (NEURONTIN) 100 mg capsule Take 200 mg by mouth three (3) times daily. , Historical Med      loperamide (IMODIUM) 2 mg capsule Take 4 mg by mouth four (4) times daily. ==HOLD FOR CONSTIPATION OR IF NO BM FOR 2 DAYS==, Historical Med      metoprolol succinate (TOPROL-XL) 25 mg XL tablet Take 12.5 mg by mouth daily. , Historical Med      famotidine (PEPCID) 20 mg tablet Take 1 Tab by mouth nightly. Can be obtained over-the-counter, Print, Disp-30 Tab, R-2      b complex-vitamin c-folic acid (NEPHROCAPS) 1 mg capsule Take 1 Cap by mouth daily. , Print, Disp-30 Cap, R-2         STOP taking these medications       ergocalciferol (ERGOCALCIFEROL) 50,000 unit capsule Comments:   Reason for Stopping:         psyllium husk-aspartame (METAMUCIL FIBER) 3.4 gram pwpk packet Comments:   Reason for Stopping:         HYDROmorphone (DILAUDID) 2 mg tablet Comments:   Reason for Stopping:                  Significant Diagnostic Studies:   Recent Labs      01/13/17   0552  01/12/17   1217   WBC  12.7*  12.3*   HGB  9.3*  9.3*   HCT  28.7*  28.9*   PLT  297  269     Recent Labs      01/13/17   0552  01/12/17   1217  01/11/17   1519   NA  139  137  142   K  4.7  4.7  3.6   CL  101  100  101   CO2  20*  21  28   BUN  48*  40*  32*   CREA  10.40*  9.26*  7.77*   GLU  90  78  100   CA  8.3*  8.4*  8.2*   MG   --   1.4*   --    PHOS   --   8.5*   --      Recent Labs      01/12/17   1217  01/11/17   1519   SGOT  18  17   ALT  21  21   AP  95  92   TBILI  1.3*  0.7   TP  6.8  6.9   ALB  2.9*  2.8*   GLOB  3.9  4.1*   LPSE   --   157     No results for input(s): INR, PTP, APTT in the last 72 hours. No lab exists for component: INREXT, INREXT   Recent Labs      01/11/17   1519   FERR  1620*      No results for input(s): PH, PCO2, PO2 in the last 72 hours. No results for input(s): CPK, CKMB in the last 72 hours. No lab exists for component: TROPONINI  Lab Results   Component Value Date/Time    Glucose (POC) 89 12/03/2016 04:36 PM    Glucose (POC) 106 12/03/2016 02:23 PM    Glucose (POC) 85 12/17/2015 06:22 AM    Glucose (POC) 108 12/16/2015 09:17 PM    Glucose (POC) 111 12/16/2015 04:43 PM           ________________________________________________________________________    Damaso Gan Time for face to face meeting with the pt and examination including care coordination with staff and chart review (>50% of total time listed here )  approx.  20 min]    ________________________________________________________________________    Frank Christiansen MD  1/13/2017

## 2017-01-13 NOTE — PROGRESS NOTES
G I Progress Note                                 Thursday                 Date/Time:  2017 11:51 PM      NAME:  Aditya Farmer :  1965    MRN:  [id number]           Assessment: complex medical problem set with severity index requiring assessment of all medical problems with coordination of other providers recommendation/plans:    Patient Active Problem List    Diagnosis Date Noted    Partial small bowel obstruction ([facility] [address]  well known to me for prolonged stay at [facility]  Hx of small bowel obstruction recurrent with high out put ileostomy. Reversal of ileostomy and short bowel syndrome  Recent egd and colon 2 months ago for gi bleeding site not clear. Citrulline level were low but not less than 15  If below this chronic tpn needed   All causes of diarrhea investigated partial response to octreotide, rifaxamin and viberzi. \  Would not repeat egd colon   Diet restriction to lactose, caffeince and getting  most elemental po diet Needed.  GIB (gastrointestinal bleeding) Old records for previous egd colon and capsule studies . 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 Citrulline level   2. Old record per above no planned studies  3.  Dietician to see for diet for short gut syndrome and possible supplementation                  Subjective        Yes  No  Yes No     []   [x]  Fever/chills   [x]   []     Abdominal Pain    []   [x]  Cough   []   [x]                               Nausea / Vomiting    []   [x] Sputum  []   [x]                                 Constipation    []   [x]  Chest Pain   [x]                     []    Diarrhea    []        [x]            SOB/SIEGEL  []   [x]                                Gi bleeding     []          [x] Headache  []        [x]                                  []         [x]         Dysuria  [x]         []                                []         [x]          Joint Pain  []         []                                                                  Past Med History and Social history reviewed                                Medications reviewed and discussed with patient     Current Facility-Administered Medications   Medication Dose Route Frequency    labetalol (NORMODYNE) tablet 200 mg  200 mg Oral Q12H    amLODIPine (NORVASC) tablet 10 mg  10 mg Oral DAILY    magnesium sulfate 1 g/100 ml IVPB (premix or compounded)  1 g IntraVENous ONCE    sodium chloride (NS) flush 5-10 mL  5-10 mL IntraVENous Q8H    sodium chloride (NS) flush 5-10 mL  5-10 mL IntraVENous PRN    B complex-vitaminC-folic acid (NEPHROCAP) cap  1 Cap Oral DAILY    ergocalciferol (ERGOCALCIFEROL) capsule 50,000 Units  50,000 Units Oral Q7D    HYDROmorphone (DILAUDID) tablet 2 mg  2 mg Oral Q4H PRN    psyllium husk-aspartame (METAMUCIL FIBER) packet 1 Packet  1 Packet Oral BID    0.9% sodium chloride infusion 250 mL  250 mL IntraVENous PRN    sodium chloride (NS) flush 5-10 mL  5-10 mL IntraVENous Q8H    sodium chloride (NS) flush 5-10 mL  5-10 mL IntraVENous PRN    acetaminophen (TYLENOL) tablet 650 mg  650 mg Oral Q4H PRN    ondansetron (ZOFRAN) injection 4 mg  4 mg IntraVENous Q4H PRN    docusate sodium (COLACE) capsule 100 mg  100 mg Oral BID    zolpidem (AMBIEN) tablet 5 mg  5 mg Oral QHS PRN    pantoprazole (PROTONIX) 40 mg in sodium chloride 0.9 % 10 mL injection  40 mg IntraVENous Q12H    HYDROmorphone (PF) (DILAUDID) injection 1 mg  1 mg IntraVENous Q4H PRN           Objective:                                                                                                                              Vitals:  Visit Vitals    BP (!) 163/91 (BP 1 Location: Right arm, BP Patient Position: Sitting)    Pulse 100    Temp 98.1 °F (36.7 °C)    Resp 12    Ht 5' 8\" (1.727 m)    Wt 53.5 kg (118 lb)    SpO2 100%    BMI 17.94 kg/m2     Temp (24hrs), Av.1 °F (36.7 °C), Min:96.9 °F (36.1 °C), Max:99 °F (37.2 °C)      O2 Device: Room air    Last 24hr Input/Output:      Date 17 - 17 - 17 0659   Shift 9695-5516 24 Hour Total 2597-3812 3791-1836 24 Hour Total   I  N  T  A  K  E   Blood 449.2 449.2 338.3  338. 3      Volume (TRANSFUSE PACKED RBC'S) 449.2 449.2         Volume (TRANSFUSE PACKED RBC'S)   338.3  338. 3    Shift Total  (mL/kg) 449.2  (8.4) 449.2  (8.4) 338.3  (6.3)  338.3  (6.3)   O  U  T  P  U  T   Shift Total  (mL/kg)        .2 449.2 338.3  338. 3   Weight (kg) 53.5 53.5 53.5 53.5 53.5                  PHYSICAL EXAM:   GENERAL       Alert, cooperative, no distress, appears stated age       [de-identified]:      Atraumatic, normocephalic         Conjunctiva  [x] pale        [] normal          Pupils equal to light and accommodation         no sinus tenderness  no nasal discharge          Mucous membranes and tongue   [x] dry      [] moist    NECK:Lymph nodes      Cervical, supraclavicular, and axillary nodes normal.                HEART:    No murmurs or Gallops                  S1 normal [x]    S2 normal [x]    RESP:  Clear to auscultation bilaterally and percussion         Chest Wall   Normal [x]                 ABD:  soft, non-tender.  Bowel sounds normal. No masses,  no organomegaly                  MSKL:   Without  edema, upper and lower extremities Full range of motion              SKIN:   No rashes or lesions      turgor normal             NEUR:   Alert, oriented,  X 3      Motor 3+    Sensory equal                   PSYCH:    Affect   not anxious or depressed               []  Telemetry  Reviewed   [] NSR      []   Afib       [] Paced   []    PAC/PVCs                                                  Lab Data Reviewed: CBC:    Recent Labs      01/12/17   1217  01/11/17   1519   WBC  12.3*  13.1*   RBC  3.25*  2.56*   HGB  9.3*  7.3*   HCT  28.9*  23.1*   PLT  269  278   GRANS  78*  81*   LYMPH  8*  6*   EOS  2  2                                                                    CHEMISTRY:    Recent Labs      01/12/17   1217  01/11/17   1519   GLU  78  100   NA  137  142   K  4.7  3.6   CL  100  101   CO2  21  28   BUN  40*  32*   CREA  9.26*  7.77*   CA  8.4*  8.2*   AGAP  16*  13   BUCR  4*  4*   AP  95  92   TP  6.8  6.9   ALB  2.9*  2.8*   GLOB  3.9  4.1*   AGRAT  0.7*  0.7*                                                            LIVER ENZYMES:                               Recent Labs      01/12/17   1217   TP  6.8   ALB  2.9*   AP  95   SGOT  18                                                           Coagulation Profile                                               No results for input(s): INR in the last 72 hours. No lab exists for component: PT, PTT, INREXT                                                    INFLAMMATION STUDIES:                                                Lab Results   Component Value Date/Time    C-Reactive protein 4.04 01/12/2017 12:17 PM       See electronic medical records for all procedures/Xrays and details which were not copied into this note but were reviewed prior to creation of Plan.     Risk of deterioration:   []       Low     [x]      Moderate     []     High                                               Time spent with patient:  []     15 mins                       [x]         25 mins                                                []      60 minutes                             []      Critical Care Provided                                       []      >50% of visit spent in                                          counseling                             and coordination of  care        Communication with   [x]        Patient                     []         Family []   Care Manager         [x]        Nursing                   []   Specialist /PCP                                                       ___________________________________________________    Physician: Bhavna Colby MD

## 2017-01-13 NOTE — PROGRESS NOTES
Pharmacy Medication Reconciliation     Recommendations/Findings:             -Clarified PTA med list with prescription vials brought in by patient's mother. PTA medication list was corrected to the following:     Prior to Admission Medications   Prescriptions Last Dose Informant Patient Reported? Taking? amLODIPine (NORVASC) 5 mg tablet 1/11/2017  Yes Yes   Sig: Take 5 mg by mouth daily. b complex-vitamin c-folic acid (NEPHROCAPS) 1 mg capsule 1/11/2017 at Unknown time  No Yes   Sig: Take 1 Cap by mouth daily. cholecalciferol, VITAMIN D3, (VITAMIN D3) 5,000 unit tab tablet 1/11/2017  Yes Yes   Sig: Take 5,000 Units by mouth daily. famotidine (PEPCID) 20 mg tablet 1/11/2017 at Unknown time  No Yes   Sig: Take 1 Tab by mouth nightly. Can be obtained over-the-counter   gabapentin (NEURONTIN) 100 mg capsule 1/11/2017  Yes Yes   Sig: Take 200 mg by mouth three (3) times daily. lipase-protease-amylase (CREON) 36,000-114,000- 180,000 unit cpDR 1/11/2017  Yes Yes   Sig: Take 2 Caps by mouth three (3) times daily (with meals). and may also take one capsule with three snacks per day. Do not take more than 15 capsules per day. loperamide (IMODIUM) 2 mg capsule   Yes Yes   Sig: Take 4 mg by mouth four (4) times daily. ==HOLD FOR CONSTIPATION OR IF NO BM FOR 2 DAYS==   metoprolol succinate (TOPROL-XL) 25 mg XL tablet 1/11/2017  Yes Yes   Sig: Take 12.5 mg by mouth daily.       Facility-Administered Medications: None          Thank you,  Ashley Perea, [place] [facility]

## 2017-01-16 ENCOUNTER — TELEPHONE (OUTPATIENT)
Dept: BEHAVIORAL HEALTH UNIT | Age: 52
End: 2017-01-16

## 2017-01-16 LAB — LACTOFERRIN, LCTFLT: 2.52 UG/ML(G) (ref 0–7.24)

## 2017-01-17 LAB
(HCYS)2 SERPL-SCNC: <0.1 UMOL/L (ref 0–0.1)
A-AMINOBUTYR SERPL-SCNC: 7.7 UMOL/L (ref 5.4–34.5)
AAA SERPL-SCNC: <0.5 UMOL/L (ref 0–2.2)
ALANINE SERPL-SCNC: 273.7 UMOL/L (ref 124.8–564.2)
ALLOISOLEUCINE SERPL-SCNC: 0.8 UMOL/L (ref 0.4–3.2)
AMINO ACID PAT SERPL-IMP: ABNORMAL
ARGININE SERPL-SCNC: 48.9 UMOL/L (ref 32–150)
ARGININOSUCCINATE SERPL-SCNC: 1 UMOL/L (ref 0–3)
ASPARAGINE SERPL-SCNC: 60.7 UMOL/L (ref 29.5–84.5)
ASPARTATE SERPL-SCNC: 2.2 UMOL/L (ref 0.9–7.4)
B-AIB SERPL-SCNC: 15.2 UMOL/L (ref 0.3–4.3)
B-ALANINE SERPL-SCNC: 18.2 UMOL/L (ref 1.1–9)
CITRULLINE SERPL-SCNC: 73.4 UMOL/L (ref 13.7–63.2)
CYSTATHIONIN SERPL-SCNC: 2.4 UMOL/L (ref 0–0.7)
CYSTINE SERPL-SCNC: 32.5 UMOL/L (ref 13.5–60.2)
GABA SERPL-SCNC: <0.4 UMOL/L (ref 0–0.3)
GLUTAMATE SERPL-SCNC: 33.1 UMOL/L (ref 18.1–155.9)
GLUTAMINE SERPL-SCNC: 512.5 UMOL/L (ref 332–754)
GLYCINE SERPL-SCNC: 383 UMOL/L (ref 132–467)
HISTIDINE SERPL-SCNC: 74.4 UMOL/L (ref 47.2–98.5)
HOMOCITRULLINE SERPL-SCNC: 1.8 UMOL/L (ref 0–1.7)
ISOLEUCINE SERPL-SCNC: 34.3 UMOL/L (ref 27.7–112.8)
LAB DIRECTOR NAME PROVIDER: ABNORMAL
LEUCINE SERPL-SCNC: 65.6 UMOL/L (ref 54.9–205)
LYSINE SERPL-SCNC: 113 UMOL/L (ref 94–278)
METHIONINE SERPL-SCNC: 15.3 UMOL/L (ref 12.7–41.1)
OH-LYSINE SERPL-SCNC: 0.3 UMOL/L (ref 0.1–0.8)
OH-PROLINE SERPL-SCNC: 19.4 UMOL/L (ref 4.7–35.2)
ORNITHINE SERPL-SCNC: 50.2 UMOL/L (ref 30.5–131.4)
PHE SERPL-SCNC: 67.2 UMOL/L (ref 33.6–101.9)
PROLINE SERPL-SCNC: 261.5 UMOL/L (ref 84.8–352.5)
REF LAB TEST METHOD: ABNORMAL
SARCOSINE SERPL-SCNC: 2.6 UMOL/L (ref 0–4)
SERINE SERPL-SCNC: 73.7 UMOL/L (ref 48.7–145.2)
TAURINE SERPL-SCNC: 26.4 UMOL/L (ref 29.2–132.3)
THREONINE SERPL-SCNC: 78 UMOL/L (ref 67.8–211.6)
TRYPTOPHAN SERPL-SCNC: 35.4 UMOL/L (ref 23.5–93)
TYROSINE SERPL-SCNC: 43.1 UMOL/L (ref 31.1–118.1)
VALINE SERPL-SCNC: 116.5 UMOL/L (ref 102.6–345.4)

## 2017-01-17 NOTE — TELEPHONE ENCOUNTER
FOLLOW UP COMPLETED  The pt was able to provide the appropriate pt identifiers. The pt plans to attend follow up appt 1/24/17 at 0930 with Dr Toni Sims. The pt disclosed he did not require any further teachings and understood discharge instructions. The pt did not communicate any complaint/concerns.

## 2017-01-19 ENCOUNTER — APPOINTMENT (OUTPATIENT)
Dept: GENERAL RADIOLOGY | Age: 52
End: 2017-01-19
Attending: EMERGENCY MEDICINE
Payer: MEDICARE

## 2017-01-19 ENCOUNTER — HOSPITAL ENCOUNTER (EMERGENCY)
Age: 52
Discharge: HOME | End: 2017-01-19
Attending: EMERGENCY MEDICINE | Admitting: EMERGENCY MEDICINE
Payer: MEDICARE

## 2017-01-19 VITALS
HEIGHT: 68 IN | HEART RATE: 68 BPM | SYSTOLIC BLOOD PRESSURE: 155 MMHG | OXYGEN SATURATION: 100 % | BODY MASS INDEX: 18.19 KG/M2 | WEIGHT: 120 LBS | RESPIRATION RATE: 17 BRPM | DIASTOLIC BLOOD PRESSURE: 94 MMHG | TEMPERATURE: 97.8 F

## 2017-01-19 DIAGNOSIS — K91.2 SHORT GUT SYNDROME: Primary | ICD-10-CM

## 2017-01-19 DIAGNOSIS — R19.7 DIARRHEA, UNSPECIFIED TYPE: ICD-10-CM

## 2017-01-19 LAB
ALBUMIN SERPL BCP-MCNC: 3 G/DL (ref 3.5–5)
ALBUMIN/GLOB SERPL: 0.8 {RATIO} (ref 1.1–2.2)
ALP SERPL-CCNC: 93 U/L (ref 45–117)
ALT SERPL-CCNC: 19 U/L (ref 12–78)
ANION GAP BLD CALC-SCNC: 14 MMOL/L (ref 5–15)
AST SERPL W P-5'-P-CCNC: 23 U/L (ref 15–37)
BASOPHILS # BLD AUTO: 0.1 K/UL (ref 0–0.1)
BASOPHILS # BLD: 0 % (ref 0–1)
BILIRUB SERPL-MCNC: 1.1 MG/DL (ref 0.2–1)
BUN SERPL-MCNC: 26 MG/DL (ref 6–20)
BUN/CREAT SERPL: 3 (ref 12–20)
CALCIUM SERPL-MCNC: 8.3 MG/DL (ref 8.5–10.1)
CHLORIDE SERPL-SCNC: 105 MMOL/L (ref 97–108)
CO2 SERPL-SCNC: 27 MMOL/L (ref 21–32)
CREAT SERPL-MCNC: 7.79 MG/DL (ref 0.7–1.3)
EOSINOPHIL # BLD: 0.1 K/UL (ref 0–0.4)
EOSINOPHIL NFR BLD: 1 % (ref 0–7)
ERYTHROCYTE [DISTWIDTH] IN BLOOD BY AUTOMATED COUNT: 16.2 % (ref 11.5–14.5)
GLOBULIN SER CALC-MCNC: 4 G/DL (ref 2–4)
GLUCOSE SERPL-MCNC: 86 MG/DL (ref 65–100)
HCT VFR BLD AUTO: 34.9 % (ref 36.6–50.3)
HGB BLD-MCNC: 10.8 G/DL (ref 12.1–17)
LIPASE SERPL-CCNC: 154 U/L (ref 73–393)
LYMPHOCYTES # BLD AUTO: 8 % (ref 12–49)
LYMPHOCYTES # BLD: 1 K/UL (ref 0.8–3.5)
MCH RBC QN AUTO: 28.8 PG (ref 26–34)
MCHC RBC AUTO-ENTMCNC: 30.9 G/DL (ref 30–36.5)
MCV RBC AUTO: 93.1 FL (ref 80–99)
MONOCYTES # BLD: 1.6 K/UL (ref 0–1)
MONOCYTES NFR BLD AUTO: 13 % (ref 5–13)
NEUTS SEG # BLD: 9.1 K/UL (ref 1.8–8)
NEUTS SEG NFR BLD AUTO: 78 % (ref 32–75)
PLATELET # BLD AUTO: 218 K/UL (ref 150–400)
POTASSIUM SERPL-SCNC: 4.3 MMOL/L (ref 3.5–5.1)
PROT SERPL-MCNC: 7 G/DL (ref 6.4–8.2)
RBC # BLD AUTO: 3.75 M/UL (ref 4.1–5.7)
SODIUM SERPL-SCNC: 146 MMOL/L (ref 136–145)
WBC # BLD AUTO: 11.8 K/UL (ref 4.1–11.1)

## 2017-01-19 PROCEDURE — 74020 XR ABD FLAT/ ERECT: CPT

## 2017-01-19 PROCEDURE — 36415 COLL VENOUS BLD VENIPUNCTURE: CPT | Performed by: EMERGENCY MEDICINE

## 2017-01-19 PROCEDURE — C9113 INJ PANTOPRAZOLE SODIUM, VIA: HCPCS | Performed by: EMERGENCY MEDICINE

## 2017-01-19 PROCEDURE — 96375 TX/PRO/DX INJ NEW DRUG ADDON: CPT

## 2017-01-19 PROCEDURE — 96374 THER/PROPH/DIAG INJ IV PUSH: CPT

## 2017-01-19 PROCEDURE — 85025 COMPLETE CBC W/AUTO DIFF WBC: CPT | Performed by: EMERGENCY MEDICINE

## 2017-01-19 PROCEDURE — 74011250637 HC RX REV CODE- 250/637: Performed by: EMERGENCY MEDICINE

## 2017-01-19 PROCEDURE — 74011250636 HC RX REV CODE- 250/636: Performed by: EMERGENCY MEDICINE

## 2017-01-19 PROCEDURE — 99284 EMERGENCY DEPT VISIT MOD MDM: CPT

## 2017-01-19 PROCEDURE — 83690 ASSAY OF LIPASE: CPT | Performed by: EMERGENCY MEDICINE

## 2017-01-19 PROCEDURE — 80053 COMPREHEN METABOLIC PANEL: CPT | Performed by: EMERGENCY MEDICINE

## 2017-01-19 PROCEDURE — 96361 HYDRATE IV INFUSION ADD-ON: CPT

## 2017-01-19 RX ORDER — HYDROMORPHONE HYDROCHLORIDE 1 MG/ML
1 INJECTION, SOLUTION INTRAMUSCULAR; INTRAVENOUS; SUBCUTANEOUS
Status: COMPLETED | OUTPATIENT
Start: 2017-01-19 | End: 2017-01-19

## 2017-01-19 RX ORDER — ONDANSETRON 2 MG/ML
4 INJECTION INTRAMUSCULAR; INTRAVENOUS
Status: COMPLETED | OUTPATIENT
Start: 2017-01-19 | End: 2017-01-19

## 2017-01-19 RX ORDER — PANTOPRAZOLE SODIUM 40 MG/10ML
40 INJECTION, POWDER, LYOPHILIZED, FOR SOLUTION INTRAVENOUS
Status: COMPLETED | OUTPATIENT
Start: 2017-01-19 | End: 2017-01-19

## 2017-01-19 RX ORDER — FENTANYL CITRATE 50 UG/ML
50 INJECTION, SOLUTION INTRAMUSCULAR; INTRAVENOUS
Status: COMPLETED | OUTPATIENT
Start: 2017-01-19 | End: 2017-01-19

## 2017-01-19 RX ORDER — SODIUM CHLORIDE 0.9 % (FLUSH) 0.9 %
5-10 SYRINGE (ML) INJECTION EVERY 8 HOURS
Status: DISCONTINUED | OUTPATIENT
Start: 2017-01-19 | End: 2017-01-19 | Stop reason: HOSPADM

## 2017-01-19 RX ORDER — HYDROCODONE BITARTRATE AND ACETAMINOPHEN 5; 325 MG/1; MG/1
1 TABLET ORAL
Qty: 12 TAB | Refills: 0 | Status: SHIPPED | OUTPATIENT
Start: 2017-01-19

## 2017-01-19 RX ORDER — LOPERAMIDE HYDROCHLORIDE 2 MG/1
2 CAPSULE ORAL
Status: COMPLETED | OUTPATIENT
Start: 2017-01-19 | End: 2017-01-19

## 2017-01-19 RX ORDER — HYDROMORPHONE HYDROCHLORIDE 1 MG/ML
1 INJECTION, SOLUTION INTRAMUSCULAR; INTRAVENOUS; SUBCUTANEOUS
Status: DISCONTINUED | OUTPATIENT
Start: 2017-01-19 | End: 2017-01-19

## 2017-01-19 RX ORDER — SODIUM CHLORIDE 0.9 % (FLUSH) 0.9 %
5-10 SYRINGE (ML) INJECTION AS NEEDED
Status: DISCONTINUED | OUTPATIENT
Start: 2017-01-19 | End: 2017-01-19 | Stop reason: HOSPADM

## 2017-01-19 RX ADMIN — HYDROMORPHONE HYDROCHLORIDE 1 MG: 1 INJECTION, SOLUTION INTRAMUSCULAR; INTRAVENOUS; SUBCUTANEOUS at 20:20

## 2017-01-19 RX ADMIN — ONDANSETRON 4 MG: 2 INJECTION INTRAMUSCULAR; INTRAVENOUS at 18:19

## 2017-01-19 RX ADMIN — PANTOPRAZOLE SODIUM 40 MG: 40 INJECTION, POWDER, FOR SOLUTION INTRAVENOUS at 18:20

## 2017-01-19 RX ADMIN — FENTANYL CITRATE 50 MCG: 50 INJECTION, SOLUTION INTRAMUSCULAR; INTRAVENOUS at 18:19

## 2017-01-19 RX ADMIN — SODIUM CHLORIDE 250 ML: 900 INJECTION, SOLUTION INTRAVENOUS at 18:34

## 2017-01-19 RX ADMIN — LOPERAMIDE HYDROCHLORIDE 2 MG: 2 CAPSULE ORAL at 18:19

## 2017-01-19 RX ADMIN — Medication 10 ML: at 18:34

## 2017-01-19 NOTE — ED PROVIDER NOTES
HPI Comments: Denny Russell is a 46 y.o. M with PMHx significant for HTN / DVT / Short bowel syndrome who presents via EMS to [facility] ED c/o spasmodic abdominal pain with rectal pain, diarrhea and nausea. Pt endorses feeling dehydrated. He notes taking Imodium 2 mg today at 1200. Pt reports that he had dialysis yesterday. He denies any tobacco, alcohol or drug use. He specifically denies any blood in stool, fevers, chills, vomiting, chest pain, shortness of breath, headache, rash, sweating or weight loss. PCP: Miriam Ortega MD    There are no other complaints, changes, or physical findings at this time. The history is provided by the patient.         Past Medical History:   Diagnosis Date    Abscess of abdominal cavity ([address] 7/12/2016    Altered bowel elimination due to intestinal ostomy ([address] 7/14/2016    Altered bowel elimination due to intestinal ostomy ([address] 7/14/2016    Azotemia 7/12/2016    Chronic combined systolic and diastolic congestive heart failure (HCC) EF 45-50% 7/12/2016    Chronic pancreatitis ([address] 7/14/2016    Chronic pancreatitis ([address] 7/14/2016    ESRD (end stage renal disease) on dialysis ([address] M,W,F 11/16/2015    Essential hypertension with goal blood pressure less than 140/90 8/11/2016    History of DVT (deep vein thrombosis) 7/14/2016    History of DVT (deep vein thrombosis) 7/14/2016    Hx SBO 8/11/2016    Hypertension     Ileostomy in place ([address] 8/11/2016    Ill-defined condition      renal failure    LVH (left ventricular hypertrophy) 8/11/2016    On total parenteral nutrition (TPN) 7/14/2016    On total parenteral nutrition (TPN) 7/14/2016    Partial small bowel obstruction ([address] 1/12/2017    Renal transplant failure and rejection 8/11/2016    S/P IVC filter 7/14/2016    S/P IVC filter 7/14/2016    S/P small bowel resection 8/11/2016    Severe protein-calorie malnutrition Priscilla Dears: less than 60% of standard weight) ([address] 12/10/2015    Small bowel perforation [facility] 8/11/2016    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 8/11/2016       Past Surgical History:   Procedure Laterality Date    Hx other surgical       fistula left arm    Hx transplant       kidney 1.5 years ago; removed after 1 month due to complications    Hx gi       peritoneal dialysis catheter placement    Hx gi       removal of PD catheter         Family History:   Problem Relation Age of Onset    No Known Problems Mother     Cancer Father     No Known Problems Sister        Social History     Social History    Marital status:      Spouse name: N/A    Number of children: N/A    Years of education: N/A     Occupational History    Not on file. Social History Main Topics    Smoking status: Never Smoker    Smokeless tobacco: Not on file    Alcohol use No    Drug use: No    Sexual activity: No     Other Topics Concern    Not on file     Social History Narrative         ALLERGIES: Review of patient's allergies indicates no known allergies. Review of Systems   Constitutional: Negative for activity change, appetite change, chills, diaphoresis, fatigue, fever and unexpected weight change. Positive for feeling dehydrated   HENT: Negative for congestion, hearing loss, rhinorrhea, sneezing and voice change. Eyes: Negative. Negative for pain and visual disturbance. Respiratory: Negative. Negative for apnea, cough, choking, chest tightness and shortness of breath. Cardiovascular: Negative. Negative for chest pain and palpitations. Gastrointestinal: Positive for abdominal pain, diarrhea, nausea and rectal pain. Negative for abdominal distention, blood in stool and vomiting. Genitourinary: Negative. Negative for difficulty urinating, flank pain, frequency and urgency. No discharge   Musculoskeletal: Negative. Negative for arthralgias, back pain, myalgias and neck stiffness. Skin: Negative.   Negative for color change and rash.   Neurological: Negative. Negative for dizziness, seizures, syncope, speech difficulty, weakness, numbness and headaches. Hematological: Negative for adenopathy. Psychiatric/Behavioral: Negative. Negative for agitation, behavioral problems, dysphoric mood and suicidal ideas. The patient is not nervous/anxious. All other systems reviewed and are negative. Vitals:    01/19/17 1714 01/19/17 1845   BP: (!) 176/116 (!) 155/94   Pulse: 97 68   Resp: 18 17   Temp: 98.1 °F (36.7 °C) 97.8 °F (36.6 °C)   SpO2: 100% 100%   Weight: 54.4 kg (120 lb)    Height: 5' 8\" (1.727 m)             Physical Exam   Nursing note and vitals reviewed. Physical Examination: General appearance - WD, thin  Head - NC/AT  Eyes - pupils equal, round  and reactive, extraocular eye movements intact, conj/sclera clear, anicteric  Mouth - dry mouth, pharynx normal without lesions  Nose/Ears - nares clear, Tms & canals clear  Neck - supple, no significant adenopathy, trachea midline, no crepitus, c spine diffusely non-tender, no step offs  Chest - Normal respiratory effort, clear to auscultation bilaterally, no wheezes/rales/rhonchi  Heart - normal rate and regular rhythm, S1 and S2 normal, no murmurs, gallops, or rubs  Abdomen - soft, nontender, nondistended, nabs, no masses, guarding, rebound or rigidity  Neurological - alert, oriented, normal speech, cranial nerves intact, no focal motor findings, motor & sensory diffusely intact, normal gait  Extremities/MS - peripheral pulses normal, no pedal edema, all joints atraumatic, FROM, non-tender, no gross deformities, spine diffusely non-tender  Skin - normal coloration and turgor, no rashes, no lesions or lacerations    MDM  Number of Diagnoses or Management Options  Diagnosis management comments: DDx: Short gut syndrome, Chronic diarrhea, Electrolyte abnormality. Chronic pain, Bowel spasm.         Amount and/or Complexity of Data Reviewed  Clinical lab tests: ordered and reviewed  Tests in the radiology section of CPT®: ordered and reviewed  Review and summarize past medical records: yes  Discuss the patient with other providers: yes Arnette Shall)    Patient Progress  Patient progress: stable    ED Course       Procedures    CONSULT NOTE:   5:55 PM  Fernanda Meraz MD spoke with Dr. Tremaine Milian,   Specialty: General Surgery  Discussed pt's hx, disposition, and available diagnostic and imaging results. Reviewed care plans. Consultant agrees with plans as outlined. Consultant agrees with plan to treat patient's pain and to follow up. X-ray normal, awaiting labs. Written by Daisy Richmond. Johnna Leonardo, ED Scribe, as dictated by Angelique Lozano. Alicia Meraz MD.    LABORATORY TESTS:  Recent Results (from the past 12 hour(s))   CBC WITH AUTOMATED DIFF    Collection Time: 01/19/17  6:21 PM   Result Value Ref Range    WBC 11.8 (H) 4.1 - 11.1 K/uL    RBC 3.75 (L) 4.10 - 5.70 M/uL    HGB 10.8 (L) 12.1 - 17.0 g/dL    HCT 34.9 (L) 36.6 - 50.3 %    MCV 93.1 80.0 - 99.0 FL    MCH 28.8 26.0 - 34.0 PG    MCHC 30.9 30.0 - 36.5 g/dL    RDW 16.2 (H) 11.5 - 14.5 %    PLATELET 326 459 - 295 K/uL    NEUTROPHILS 78 (H) 32 - 75 %    LYMPHOCYTES 8 (L) 12 - 49 %    MONOCYTES 13 5 - 13 %    EOSINOPHILS 1 0 - 7 %    BASOPHILS 0 0 - 1 %    ABS. NEUTROPHILS 9.1 (H) 1.8 - 8.0 K/UL    ABS. LYMPHOCYTES 1.0 0.8 - 3.5 K/UL    ABS. MONOCYTES 1.6 (H) 0.0 - 1.0 K/UL    ABS. EOSINOPHILS 0.1 0.0 - 0.4 K/UL    ABS.  BASOPHILS 0.1 0.0 - 0.1 K/UL   METABOLIC PANEL, COMPREHENSIVE    Collection Time: 01/19/17  6:21 PM   Result Value Ref Range    Sodium 146 (H) 136 - 145 mmol/L    Potassium 4.3 3.5 - 5.1 mmol/L    Chloride 105 97 - 108 mmol/L    CO2 27 21 - 32 mmol/L    Anion gap 14 5 - 15 mmol/L    Glucose 86 65 - 100 mg/dL    BUN 26 (H) 6 - 20 MG/DL    Creatinine 7.79 (H) 0.70 - 1.30 MG/DL    BUN/Creatinine ratio 3 (L) 12 - 20      GFR est AA 9 (L) >60 ml/min/1.73m2    GFR est non-AA 7 (L) >60 ml/min/1.73m2    Calcium 8.3 (L) 8.5 - 10.1 MG/DL    Bilirubin, total 1.1 (H) 0.2 - 1.0 MG/DL    ALT 19 12 - 78 U/L    AST 23 15 - 37 U/L    Alk. phosphatase 93 45 - 117 U/L    Protein, total 7.0 6.4 - 8.2 g/dL    Albumin 3.0 (L) 3.5 - 5.0 g/dL    Globulin 4.0 2.0 - 4.0 g/dL    A-G Ratio 0.8 (L) 1.1 - 2.2     LIPASE    Collection Time: 01/19/17  6:21 PM   Result Value Ref Range    Lipase 154 73 - 393 U/L       IMAGING RESULTS:  XR ABD FLAT/ ERECT   Final Result   EXAM: XR ABD FLAT/ ERECT     INDICATION: Abd Pain     COMPARISON: 12/1/2016.     FINDINGS: A supine radiograph of the abdomen shows a normal bowel gas pattern. Again demonstrated are bilateral renal vascular calcifications as well as  postsurgical changes within the abdomen consisting of multiple surgical  anastomotic suture lines and vascular coils. There is bilateral hip DJD. An IVC  filter is noted.     IMPRESSION  IMPRESSION: No evidence of bowel obstruction or perforation. MEDICATIONS GIVEN:  Medications   sodium chloride (NS) flush 5-10 mL (10 mL IntraVENous Given 1/19/17 1834)   sodium chloride (NS) flush 5-10 mL (not administered)   ondansetron (ZOFRAN) injection 4 mg (4 mg IntraVENous Given 1/19/17 1819)   fentaNYL citrate (PF) injection 50 mcg (50 mcg IntraVENous Given 1/19/17 1819)   pantoprazole (PROTONIX) injection 40 mg (40 mg IntraVENous Given 1/19/17 1820)   sodium chloride 0.9 % bolus infusion 250 mL (0 mL IntraVENous IV Completed 1/19/17 1921)   loperamide (IMODIUM) capsule 2 mg (2 mg Oral Given 1/19/17 1819)   HYDROmorphone (PF) (DILAUDID) injection 1 mg (1 mg IntraVENous Given 1/19/17 2020)       IMPRESSION:  1. Short gut syndrome    2. Diarrhea, unspecified type        PLAN:  1. Discharge Medication List as of 1/19/2017  7:09 PM      CONTINUE these medications which have NOT CHANGED    Details   lipase-protease-amylase (CREON) 36,000-114,000- 180,000 unit cpDR Take 2 Caps by mouth three (3) times daily (with meals).  and may also take one capsule with three snacks per day. Do not take more than 15 capsules per day., Historical Med      amLODIPine (NORVASC) 5 mg tablet Take 5 mg by mouth daily. , Historical Med      cholecalciferol, VITAMIN D3, (VITAMIN D3) 5,000 unit tab tablet Take 5,000 Units by mouth daily. , Historical Med      gabapentin (NEURONTIN) 100 mg capsule Take 200 mg by mouth three (3) times daily. , Historical Med      loperamide (IMODIUM) 2 mg capsule Take 4 mg by mouth four (4) times daily. ==HOLD FOR CONSTIPATION OR IF NO BM FOR 2 DAYS==, Historical Med      metoprolol succinate (TOPROL-XL) 25 mg XL tablet Take 12.5 mg by mouth daily. , Historical Med      famotidine (PEPCID) 20 mg tablet Take 1 Tab by mouth nightly. Can be obtained over-the-counter, Print, Disp-30 Tab, R-2      b complex-vitamin c-folic acid (NEPHROCAPS) 1 mg capsule Take 1 Cap by mouth daily. , Print, Disp-30 Cap, R-2           2. Follow-up Information     Follow up With Details Comments Aj De Leon MD Schedule an appointment as soon as possible for a visit in 1 day  [facility]      Jacqueline Marques MD Schedule an appointment as soon as possible for a visit As needed [address] [place], [place] [id number] [phone]      [facility] EMERGENCY DEPT  As needed, If symptoms worsen [address] [facility]  [phone]        Return to ED if worse     DISCHARGE NOTE:  8:29 PM  The patient's results have been reviewed with family and/or caregiver. They verbally convey their understanding and agreement of the patient's signs, symptoms, diagnosis, treatment, and prognosis. They additionally agree to follow up as recommended in the discharge instructions or to return to the Emergency Room should the patient's condition change prior to their follow-up appointment. The family and/or caregiver verbally agrees with the care-plan and all of their questions have been answered.  The discharge instructions have also been provided to the them along with educational information regarding the patient's diagnosis and a list of reasons why the patient would want to return to the ER prior to their follow-up appointment should their condition change. Written by Jessie Cranker. Chantell Pérez ED Scribe, as dictated by Hildegarde Scheuermann. Dandre Stallings MD.        Attestations: This note is prepared by Jessie Cranker. Sarahi, acting as Scribe for Hildegarde Scheuermann. Dandre Stallings, [address] [place] [address]. Dandre Stallings MD: The scribe's documentation has been prepared under my direction and personally reviewed by me in its entirety. I confirm that the note above accurately reflects all work, treatment, procedures, and medical decision making performed by me.

## 2017-01-19 NOTE — ED NOTES
Pt reported abdominal pain,spasmodic,diarrhea,nausea,and some blood in stool. hx of chonic diarrhea. Emergency Department Nursing Plan of Care       The Nursing Plan of Care is developed from the Nursing assessment and Emergency Department Attending provider initial evaluation. The plan of care may be reviewed in the ED Provider note.     The Plan of Care was developed with the following considerations:   Patient / Family readiness to learn indicated by:verbalized understanding  Persons(s) to be included in education: patient  Barriers to Learning/Limitations:No    Signed     Sandria Moritz, RN    1/19/2017   5:18 PM

## 2017-01-20 NOTE — ED NOTES
Bedside and Verbal shift change report given to Halina (oncoming nurse) by Larisa Briggs (offgoing nurse). Report included the following information SBAR.

## 2017-01-20 NOTE — ED NOTES
Patient (s) was given copy of dc instructions and one paper script(s) and no electronic scripts. Patient (s) has verbalized understanding of instructions and script (s). Patient given a current medication reconciliation form and verbalized understanding of their medications. Patient (s) has verbalized understanding of the importance of discussing medications with  his or her physician or clinic they will be following up with. Patient alert and oriented and in no acute distress. Patient discharged home ambulatory with family.

## 2017-01-20 NOTE — ED NOTES
Pt resting in bed and appears to be in no distress, pt is requesting more pain medicine. Pt informed that the provider will order more pain med if he has a ride in room. Pt is attempting to call a ride.

## 2017-01-20 NOTE — DISCHARGE INSTRUCTIONS
Diarrhea: Care Instructions  Your Care Instructions    Diarrhea is loose, watery stools (bowel movements). The exact cause is often hard to find. Sometimes diarrhea is your body's way of getting rid of what caused an upset stomach. Viruses, food poisoning, and many medicines can cause diarrhea. Some people get diarrhea in response to emotional stress, anxiety, or certain foods. Almost everyone has diarrhea now and then. It usually isn't serious, and your stools will return to normal soon. The important thing to do is replace the fluids you have lost, so you can prevent dehydration. The doctor has checked you carefully, but problems can develop later. If you notice any problems or new symptoms, get medical treatment right away. Follow-up care is a key part of your treatment and safety. Be sure to make and go to all appointments, and call your doctor if you are having problems. It's also a good idea to know your test results and keep a list of the medicines you take. How can you care for yourself at home? · Watch for signs of dehydration, which means your body has lost too much water. Dehydration is a serious condition and should be treated right away. Signs of dehydration are:  ¨ Increasing thirst and dry eyes and mouth. ¨ Feeling faint or lightheaded. ¨ Darker urine, and a smaller amount of urine than normal.  · To prevent dehydration, drink plenty of fluids, enough so that your urine is light yellow or clear like water. Choose water and other caffeine-free clear liquids until you feel better. If you have kidney, heart, or liver disease and have to limit fluids, talk with your doctor before you increase the amount of fluids you drink. · Begin eating small amounts of mild foods the next day, if you feel like it. ¨ Try yogurt that has live cultures of Lactobacillus. (Check the label.)  ¨ Avoid spicy foods, fruits, alcohol, and caffeine until 48 hours after all symptoms are gone.   ¨ Avoid chewing gum that contains sorbitol. ¨ Avoid dairy products (except for yogurt with Lactobacillus) while you have diarrhea and for 3 days after symptoms are gone. · The doctor may recommend that you take over-the-counter medicine, such as loperamide (Imodium), if you still have diarrhea after 6 hours. Read and follow all instructions on the label. Do not use this medicine if you have bloody diarrhea, a high fever, or other signs of serious illness. Call your doctor if you think you are having a problem with your medicine. When should you call for help? Call 911 anytime you think you may need emergency care. For example, call if:  · You passed out (lost consciousness). · Your stools are maroon or very bloody. Call your doctor now or seek immediate medical care if:  · You are dizzy or lightheaded, or you feel like you may faint. · Your stools are black and look like tar, or they have streaks of blood. · You have new or worse belly pain. · You have symptoms of dehydration, such as:  ¨ Dry eyes and a dry mouth. ¨ Passing only a little dark urine. ¨ Feeling thirstier than usual.  · You have a new or higher fever. Watch closely for changes in your health, and be sure to contact your doctor if:  · Your diarrhea is getting worse. · You see pus in the diarrhea. · You are not getting better after 2 days (48 hours). Where can you learn more? Go to http://jenn-ellen.info/. Enter W153 in the search box to learn more about \"Diarrhea: Care Instructions. \"  Current as of: May 27, 2016  Content Version: 11.1  © 7340-6174 [employer]. Care instructions adapted under license by [employer] (which disclaims liability or warranty for this information). If you have questions about a medical condition or this instruction, always ask your healthcare professional. [address] any warranty or liability for your use of this information.        Abdominal Pain: Care Instructions  Your Care Instructions    Abdominal pain has many possible causes. Some aren't serious and get better on their own in a few days. Others need more testing and treatment. If your pain continues or gets worse, you need to be rechecked and may need more tests to find out what is wrong. You may need surgery to correct the problem. Don't ignore new symptoms, such as fever, nausea and vomiting, urination problems, pain that gets worse, and dizziness. These may be signs of a more serious problem. Your doctor may have recommended a follow-up visit in the next 8 to 12 hours. If you are not getting better, you may need more tests or treatment. The doctor has checked you carefully, but problems can develop later. If you notice any problems or new symptoms, get medical treatment right away. Follow-up care is a key part of your treatment and safety. Be sure to make and go to all appointments, and call your doctor if you are having problems. It's also a good idea to know your test results and keep a list of the medicines you take. How can you care for yourself at home? · Rest until you feel better. · To prevent dehydration, drink plenty of fluids, enough so that your urine is light yellow or clear like water. Choose water and other caffeine-free clear liquids until you feel better. If you have kidney, heart, or liver disease and have to limit fluids, talk with your doctor before you increase the amount of fluids you drink. · If your stomach is upset, eat mild foods, such as rice, dry toast or crackers, bananas, and applesauce. Try eating several small meals instead of two or three large ones. · Wait until 48 hours after all symptoms have gone away before you have spicy foods, alcohol, and drinks that contain caffeine. · Do not eat foods that are high in fat. · Avoid anti-inflammatory medicines such as aspirin, ibuprofen (Advil, Motrin), and naproxen (Aleve). These can cause stomach upset.  Talk to your doctor if you take daily aspirin for another health problem. When should you call for help? Call 911 anytime you think you may need emergency care. For example, call if:  · You passed out (lost consciousness). · You pass maroon or very bloody stools. · You vomit blood or what looks like coffee grounds. · You have new, severe belly pain. Call your doctor now or seek immediate medical care if:  · Your pain gets worse, especially if it becomes focused in one area of your belly. · You have a new or higher fever. · Your stools are black and look like tar, or they have streaks of blood. · You have unexpected vaginal bleeding. · You have symptoms of a urinary tract infection. These may include:  ¨ Pain when you urinate. ¨ Urinating more often than usual.  ¨ Blood in your urine. · You are dizzy or lightheaded, or you feel like you may faint. Watch closely for changes in your health, and be sure to contact your doctor if:  · You are not getting better after 1 day (24 hours). Where can you learn more? Go to http://jenn-ellen.info/. Enter E169 in the search box to learn more about \"Abdominal Pain: Care Instructions. \"  Current as of: May 27, 2016  Content Version: 11.1  © 0776-4405 [employer]. Care instructions adapted under license by [employer] (which disclaims liability or warranty for this information). If you have questions about a medical condition or this instruction, always ask your healthcare professional. [place] [address] any warranty or liability for your use of this information.

## 2017-01-26 ENCOUNTER — TELEPHONE (OUTPATIENT)
Dept: INTERNAL MEDICINE CLINIC | Age: 52
End: 2017-01-26

## 2017-01-26 ENCOUNTER — PATIENT OUTREACH (OUTPATIENT)
Dept: INTERNAL MEDICINE CLINIC | Age: 52
End: 2017-01-26

## 2017-01-26 NOTE — PROGRESS NOTES
Navigator received referral from Penelope Vizcarra R.N. As result of this patient's high risk listing within a MSSP report. [facility] record review reveals: last [facility] office visit was 6 months ago with Jenny Lyons MD, [facility]. Past Medical History   Diagnosis Date    Abscess of abdominal cavity ([address] 2016    Altered bowel elimination due to intestinal ostomy ([address] 2016    Altered bowel elimination due to intestinal ostomy ([address] 2016    Azotemia 2016    Chronic combined systolic and diastolic congestive heart failure (HCC) EF 45-50% 2016    Chronic pancreatitis ([address] 2016    Chronic pancreatitis ([address] 2016    ESRD (end stage renal disease) on dialysis ([address] M,W,F 2015    Essential hypertension with goal blood pressure less than 140/90 2016    History of DVT (deep vein thrombosis) 2016    History of DVT (deep vein thrombosis) 2016    Hx SBO 2016    Hypertension     Ileostomy in place ([address] 2016    Ill-defined condition      renal failure    LVH (left ventricular hypertrophy) 2016    On total parenteral nutrition (TPN) 2016    On total parenteral nutrition (TPN) 2016    Partial small bowel obstruction ([address] 2017    Renal transplant failure and rejection 2016    S/P IVC filter 2016    S/P IVC filter 2016    S/P small bowel resection 2016    Severe protein-calorie malnutrition Severianoles Mar: less than 60% of standard weight) ([address] 12/10/2015    Small bowel perforation ([address] 2016    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 2016     The patient's physical address is [place] miles from [facility]. The Navigator contacted the patient by telephone. HIPAA was verified by name and . The patient confirmed that he had completed a visit with Dr. Lindsey Brown but has decided to continue to see Dr. Valdene Marinas [address] [place] [address].  Navigator inquired when was his next appointment. The patient replied that he plans to schedule an appointment after he completes dialysis on [id number]. Navigator expressed thanks for the patient's time and ended the call.